# Patient Record
Sex: FEMALE | Race: WHITE | NOT HISPANIC OR LATINO
[De-identification: names, ages, dates, MRNs, and addresses within clinical notes are randomized per-mention and may not be internally consistent; named-entity substitution may affect disease eponyms.]

---

## 2017-01-29 PROBLEM — Z80.1 FAMILY HISTORY OF LUNG CANCER: Status: ACTIVE | Noted: 2017-01-29

## 2018-01-31 ENCOUNTER — APPOINTMENT (OUTPATIENT)
Dept: OBGYN | Facility: CLINIC | Age: 58
End: 2018-01-31
Payer: COMMERCIAL

## 2018-01-31 VITALS
HEIGHT: 64 IN | BODY MASS INDEX: 26.46 KG/M2 | SYSTOLIC BLOOD PRESSURE: 152 MMHG | WEIGHT: 155 LBS | DIASTOLIC BLOOD PRESSURE: 80 MMHG

## 2018-01-31 PROCEDURE — 99396 PREV VISIT EST AGE 40-64: CPT

## 2018-02-05 LAB — CYTOLOGY CVX/VAG DOC THIN PREP: NORMAL

## 2019-03-19 ENCOUNTER — APPOINTMENT (OUTPATIENT)
Dept: OBGYN | Facility: CLINIC | Age: 59
End: 2019-03-19
Payer: COMMERCIAL

## 2019-03-19 VITALS
SYSTOLIC BLOOD PRESSURE: 122 MMHG | WEIGHT: 154 LBS | DIASTOLIC BLOOD PRESSURE: 82 MMHG | BODY MASS INDEX: 26.29 KG/M2 | HEIGHT: 64 IN

## 2019-03-19 DIAGNOSIS — Z00.00 ENCOUNTER FOR GENERAL ADULT MEDICAL EXAMINATION W/OUT ABNORMAL FINDINGS: ICD-10-CM

## 2019-03-19 PROCEDURE — 99396 PREV VISIT EST AGE 40-64: CPT

## 2019-03-19 NOTE — PHYSICAL EXAM
[Acute Distress] : no acute distress [Alert] : alert [Awake] : awake [Mass] : no breast mass [Nipple Discharge] : no nipple discharge [Axillary LAD] : no axillary lymphadenopathy [Soft] : soft [Tender] : non tender [Oriented x3] : oriented to person, place, and time [Distended] : not distended [Normal] : cervix [Adnexa Absent] : absent bilaterally [Ovarian Mass (___ Cm)] : there were no adnexal masses

## 2019-03-19 NOTE — REVIEW OF SYSTEMS
[SOB on Exertion] : shortness of breath during exertion [Joint Pain] : joint pain [Nl] : Hematologic/Lymphatic

## 2019-03-25 LAB — CYTOLOGY CVX/VAG DOC THIN PREP: NORMAL

## 2019-05-31 ENCOUNTER — TRANSCRIPTION ENCOUNTER (OUTPATIENT)
Age: 59
End: 2019-05-31

## 2020-06-01 ENCOUNTER — APPOINTMENT (OUTPATIENT)
Dept: OBGYN | Facility: CLINIC | Age: 60
End: 2020-06-01
Payer: COMMERCIAL

## 2020-06-01 VITALS
WEIGHT: 164 LBS | BODY MASS INDEX: 28 KG/M2 | SYSTOLIC BLOOD PRESSURE: 152 MMHG | HEIGHT: 64 IN | DIASTOLIC BLOOD PRESSURE: 90 MMHG

## 2020-06-01 PROCEDURE — 99396 PREV VISIT EST AGE 40-64: CPT

## 2020-06-04 LAB — CYTOLOGY CVX/VAG DOC THIN PREP: ABNORMAL

## 2021-04-01 ENCOUNTER — TRANSCRIPTION ENCOUNTER (OUTPATIENT)
Age: 61
End: 2021-04-01

## 2021-04-03 ENCOUNTER — APPOINTMENT (OUTPATIENT)
Dept: DISASTER EMERGENCY | Facility: HOSPITAL | Age: 61
End: 2021-04-03

## 2021-04-03 ENCOUNTER — OUTPATIENT (OUTPATIENT)
Dept: INPATIENT UNIT | Facility: HOSPITAL | Age: 61
LOS: 1 days | End: 2021-04-03
Payer: COMMERCIAL

## 2021-04-03 VITALS
OXYGEN SATURATION: 90 % | DIASTOLIC BLOOD PRESSURE: 75 MMHG | RESPIRATION RATE: 20 BRPM | HEART RATE: 92 BPM | WEIGHT: 154.98 LBS | TEMPERATURE: 98 F | SYSTOLIC BLOOD PRESSURE: 127 MMHG | HEIGHT: 64 IN

## 2021-04-03 VITALS
HEART RATE: 74 BPM | RESPIRATION RATE: 18 BRPM | OXYGEN SATURATION: 92 % | DIASTOLIC BLOOD PRESSURE: 81 MMHG | SYSTOLIC BLOOD PRESSURE: 120 MMHG | TEMPERATURE: 98 F

## 2021-04-03 DIAGNOSIS — U07.1 COVID-19: ICD-10-CM

## 2021-04-03 PROCEDURE — M0243: CPT

## 2021-04-03 RX ORDER — SODIUM CHLORIDE 9 MG/ML
250 INJECTION INTRAMUSCULAR; INTRAVENOUS; SUBCUTANEOUS
Refills: 0 | Status: COMPLETED | OUTPATIENT
Start: 2021-04-03 | End: 2021-04-03

## 2021-04-03 RX ADMIN — SODIUM CHLORIDE 25 MILLILITER(S): 9 INJECTION INTRAMUSCULAR; INTRAVENOUS; SUBCUTANEOUS at 10:26

## 2021-04-03 NOTE — MONOCLONAL ANTIBODY INFUSION - EXAM
Physical Exam:    Appearance: NAD	  HEENT:   Normal oral mucosa  Lymphatic: No lymphadenopathy  Cardiovascular: Normal S1 S2, No JVD, No edema  Respiratory: B/L symmetrical chest rise, Lungs clear to auscultation, non-labored breathing  Gastrointestinal:  Soft, Non-tender, + BS	  Skin: warm and dry  Neurologic: Non-focal  Extremities: Normal range of motion   Physical Exam:  Appearance: NAD	  HEENT:   Normal oral mucosa  Lymphatic: No lymphadenopathy  Cardiovascular: Normal S1 S2, No JVD, No edema  Respiratory: B/L symmetrical chest rise, Lungs clear to auscultation, non-labored breathing  Gastrointestinal:  Soft, Non-tender, + BS	  Skin: warm and dry  Neurologic: Non-focal  Extremities: Normal range of motion

## 2021-04-04 ENCOUNTER — TRANSCRIPTION ENCOUNTER (OUTPATIENT)
Age: 61
End: 2021-04-04

## 2021-04-04 ENCOUNTER — INPATIENT (INPATIENT)
Facility: HOSPITAL | Age: 61
LOS: 5 days | Discharge: HOME | End: 2021-04-10
Attending: INTERNAL MEDICINE | Admitting: INTERNAL MEDICINE
Payer: COMMERCIAL

## 2021-04-04 VITALS
HEIGHT: 64 IN | HEART RATE: 112 BPM | SYSTOLIC BLOOD PRESSURE: 136 MMHG | RESPIRATION RATE: 26 BRPM | DIASTOLIC BLOOD PRESSURE: 77 MMHG | TEMPERATURE: 97 F | OXYGEN SATURATION: 95 %

## 2021-04-04 DIAGNOSIS — Z90.722 ACQUIRED ABSENCE OF OVARIES, BILATERAL: Chronic | ICD-10-CM

## 2021-04-04 DIAGNOSIS — J96.11 CHRONIC RESPIRATORY FAILURE WITH HYPOXIA: ICD-10-CM

## 2021-04-04 DIAGNOSIS — J44.9 CHRONIC OBSTRUCTIVE PULMONARY DISEASE, UNSPECIFIED: ICD-10-CM

## 2021-04-04 DIAGNOSIS — E87.6 HYPOKALEMIA: ICD-10-CM

## 2021-04-04 DIAGNOSIS — R06.02 SHORTNESS OF BREATH: ICD-10-CM

## 2021-04-04 DIAGNOSIS — Z29.9 ENCOUNTER FOR PROPHYLACTIC MEASURES, UNSPECIFIED: ICD-10-CM

## 2021-04-04 DIAGNOSIS — U07.1 COVID-19: ICD-10-CM

## 2021-04-04 LAB
ALBUMIN SERPL ELPH-MCNC: 3.3 G/DL — SIGNIFICANT CHANGE UP (ref 3.3–5)
ALP SERPL-CCNC: 62 U/L — SIGNIFICANT CHANGE UP (ref 40–120)
ALT FLD-CCNC: 21 U/L — SIGNIFICANT CHANGE UP (ref 4–33)
ANION GAP SERPL CALC-SCNC: 13 MMOL/L — SIGNIFICANT CHANGE UP (ref 7–14)
APTT BLD: 28.5 SEC — SIGNIFICANT CHANGE UP (ref 27–36.3)
AST SERPL-CCNC: 35 U/L — HIGH (ref 4–32)
BASOPHILS # BLD AUTO: 0 K/UL — SIGNIFICANT CHANGE UP (ref 0–0.2)
BASOPHILS NFR BLD AUTO: 0 % — SIGNIFICANT CHANGE UP (ref 0–2)
BILIRUB SERPL-MCNC: 0.8 MG/DL — SIGNIFICANT CHANGE UP (ref 0.2–1.2)
BLOOD GAS VENOUS COMPREHENSIVE RESULT: SIGNIFICANT CHANGE UP
BUN SERPL-MCNC: 9 MG/DL — SIGNIFICANT CHANGE UP (ref 7–23)
CALCIUM SERPL-MCNC: 8.5 MG/DL — SIGNIFICANT CHANGE UP (ref 8.4–10.5)
CHLORIDE SERPL-SCNC: 96 MMOL/L — LOW (ref 98–107)
CO2 SERPL-SCNC: 23 MMOL/L — SIGNIFICANT CHANGE UP (ref 22–31)
CREAT SERPL-MCNC: 0.48 MG/DL — LOW (ref 0.5–1.3)
EOSINOPHIL # BLD AUTO: 0.24 K/UL — SIGNIFICANT CHANGE UP (ref 0–0.5)
EOSINOPHIL NFR BLD AUTO: 3.5 % — SIGNIFICANT CHANGE UP (ref 0–6)
GLUCOSE BLDC GLUCOMTR-MCNC: 144 MG/DL — HIGH (ref 70–99)
GLUCOSE SERPL-MCNC: 124 MG/DL — HIGH (ref 70–99)
HCT VFR BLD CALC: 44.1 % — SIGNIFICANT CHANGE UP (ref 34.5–45)
HGB BLD-MCNC: 13.6 G/DL — SIGNIFICANT CHANGE UP (ref 11.5–15.5)
IANC: 5.2 K/UL — SIGNIFICANT CHANGE UP (ref 1.5–8.5)
INR BLD: 1.19 RATIO — HIGH (ref 0.88–1.16)
LYMPHOCYTES # BLD AUTO: 0.42 K/UL — LOW (ref 1–3.3)
LYMPHOCYTES # BLD AUTO: 6.1 % — LOW (ref 13–44)
MAGNESIUM SERPL-MCNC: 1.8 MG/DL — SIGNIFICANT CHANGE UP (ref 1.6–2.6)
MCHC RBC-ENTMCNC: 24.6 PG — LOW (ref 27–34)
MCHC RBC-ENTMCNC: 30.8 GM/DL — LOW (ref 32–36)
MCV RBC AUTO: 79.7 FL — LOW (ref 80–100)
MONOCYTES # BLD AUTO: 0.3 K/UL — SIGNIFICANT CHANGE UP (ref 0–0.9)
MONOCYTES NFR BLD AUTO: 4.4 % — SIGNIFICANT CHANGE UP (ref 2–14)
NEUTROPHILS # BLD AUTO: 5.65 K/UL — SIGNIFICANT CHANGE UP (ref 1.8–7.4)
NEUTROPHILS NFR BLD AUTO: 81.6 % — HIGH (ref 43–77)
PLATELET # BLD AUTO: 189 K/UL — SIGNIFICANT CHANGE UP (ref 150–400)
POTASSIUM SERPL-MCNC: 3 MMOL/L — LOW (ref 3.5–5.3)
POTASSIUM SERPL-SCNC: 3 MMOL/L — LOW (ref 3.5–5.3)
PROT SERPL-MCNC: 7.2 G/DL — SIGNIFICANT CHANGE UP (ref 6–8.3)
PROTHROM AB SERPL-ACNC: 13.6 SEC — SIGNIFICANT CHANGE UP (ref 10.6–13.6)
RBC # BLD: 5.53 M/UL — HIGH (ref 3.8–5.2)
RBC # FLD: 13.8 % — SIGNIFICANT CHANGE UP (ref 10.3–14.5)
SARS-COV-2 RNA SPEC QL NAA+PROBE: DETECTED
SODIUM SERPL-SCNC: 132 MMOL/L — LOW (ref 135–145)
TROPONIN T, HIGH SENSITIVITY RESULT: <6 NG/L — SIGNIFICANT CHANGE UP
WBC # BLD: 6.93 K/UL — SIGNIFICANT CHANGE UP (ref 3.8–10.5)
WBC # FLD AUTO: 6.93 K/UL — SIGNIFICANT CHANGE UP (ref 3.8–10.5)

## 2021-04-04 PROCEDURE — 99223 1ST HOSP IP/OBS HIGH 75: CPT

## 2021-04-04 PROCEDURE — 93010 ELECTROCARDIOGRAM REPORT: CPT

## 2021-04-04 PROCEDURE — 99285 EMERGENCY DEPT VISIT HI MDM: CPT | Mod: 25

## 2021-04-04 PROCEDURE — 71045 X-RAY EXAM CHEST 1 VIEW: CPT | Mod: 26

## 2021-04-04 RX ORDER — SODIUM CHLORIDE 9 MG/ML
500 INJECTION INTRAMUSCULAR; INTRAVENOUS; SUBCUTANEOUS ONCE
Refills: 0 | Status: COMPLETED | OUTPATIENT
Start: 2021-04-04 | End: 2021-04-04

## 2021-04-04 RX ORDER — TIOTROPIUM BROMIDE 18 UG/1
1 CAPSULE ORAL; RESPIRATORY (INHALATION) EVERY 24 HOURS
Refills: 0 | Status: DISCONTINUED | OUTPATIENT
Start: 2021-04-04 | End: 2021-04-10

## 2021-04-04 RX ORDER — SODIUM CHLORIDE 9 MG/ML
1000 INJECTION INTRAMUSCULAR; INTRAVENOUS; SUBCUTANEOUS ONCE
Refills: 0 | Status: COMPLETED | OUTPATIENT
Start: 2021-04-04 | End: 2021-04-04

## 2021-04-04 RX ORDER — REMDESIVIR 5 MG/ML
INJECTION INTRAVENOUS
Refills: 0 | Status: COMPLETED | OUTPATIENT
Start: 2021-04-04 | End: 2021-04-08

## 2021-04-04 RX ORDER — DEXAMETHASONE 0.5 MG/5ML
6 ELIXIR ORAL ONCE
Refills: 0 | Status: COMPLETED | OUTPATIENT
Start: 2021-04-04 | End: 2021-04-04

## 2021-04-04 RX ORDER — POTASSIUM CHLORIDE 20 MEQ
10 PACKET (EA) ORAL
Refills: 0 | Status: DISCONTINUED | OUTPATIENT
Start: 2021-04-04 | End: 2021-04-10

## 2021-04-04 RX ORDER — ACETAMINOPHEN 500 MG
650 TABLET ORAL EVERY 6 HOURS
Refills: 0 | Status: DISCONTINUED | OUTPATIENT
Start: 2021-04-04 | End: 2021-04-10

## 2021-04-04 RX ORDER — DEXAMETHASONE 0.5 MG/5ML
6 ELIXIR ORAL DAILY
Refills: 0 | Status: DISCONTINUED | OUTPATIENT
Start: 2021-04-05 | End: 2021-04-10

## 2021-04-04 RX ORDER — ENOXAPARIN SODIUM 100 MG/ML
40 INJECTION SUBCUTANEOUS DAILY
Refills: 0 | Status: DISCONTINUED | OUTPATIENT
Start: 2021-04-04 | End: 2021-04-10

## 2021-04-04 RX ORDER — FLUTICASONE PROPIONATE AND SALMETEROL 50; 250 UG/1; UG/1
1 POWDER ORAL; RESPIRATORY (INHALATION)
Qty: 0 | Refills: 0 | DISCHARGE

## 2021-04-04 RX ORDER — BUDESONIDE AND FORMOTEROL FUMARATE DIHYDRATE 160; 4.5 UG/1; UG/1
2 AEROSOL RESPIRATORY (INHALATION)
Refills: 0 | Status: DISCONTINUED | OUTPATIENT
Start: 2021-04-04 | End: 2021-04-10

## 2021-04-04 RX ORDER — CEFTRIAXONE 500 MG/1
1000 INJECTION, POWDER, FOR SOLUTION INTRAMUSCULAR; INTRAVENOUS ONCE
Refills: 0 | Status: DISCONTINUED | OUTPATIENT
Start: 2021-04-04 | End: 2021-04-04

## 2021-04-04 RX ORDER — REMDESIVIR 5 MG/ML
200 INJECTION INTRAVENOUS EVERY 24 HOURS
Refills: 0 | Status: COMPLETED | OUTPATIENT
Start: 2021-04-04 | End: 2021-04-04

## 2021-04-04 RX ORDER — REMDESIVIR 5 MG/ML
100 INJECTION INTRAVENOUS EVERY 24 HOURS
Refills: 0 | Status: COMPLETED | OUTPATIENT
Start: 2021-04-05 | End: 2021-04-08

## 2021-04-04 RX ORDER — POTASSIUM CHLORIDE 20 MEQ
20 PACKET (EA) ORAL EVERY 4 HOURS
Refills: 0 | Status: COMPLETED | OUTPATIENT
Start: 2021-04-04 | End: 2021-04-04

## 2021-04-04 RX ADMIN — BUDESONIDE AND FORMOTEROL FUMARATE DIHYDRATE 2 PUFF(S): 160; 4.5 AEROSOL RESPIRATORY (INHALATION) at 22:30

## 2021-04-04 RX ADMIN — SODIUM CHLORIDE 500 MILLILITER(S): 9 INJECTION INTRAMUSCULAR; INTRAVENOUS; SUBCUTANEOUS at 10:07

## 2021-04-04 RX ADMIN — Medication 6 MILLIGRAM(S): at 09:22

## 2021-04-04 RX ADMIN — Medication 20 MILLIEQUIVALENT(S): at 13:54

## 2021-04-04 RX ADMIN — Medication 100 MILLIEQUIVALENT(S): at 10:49

## 2021-04-04 RX ADMIN — Medication 20 MILLIEQUIVALENT(S): at 19:26

## 2021-04-04 RX ADMIN — REMDESIVIR 200 MILLIGRAM(S): 5 INJECTION INTRAVENOUS at 22:30

## 2021-04-04 RX ADMIN — Medication 100 MILLIEQUIVALENT(S): at 13:18

## 2021-04-04 RX ADMIN — SODIUM CHLORIDE 1000 MILLILITER(S): 9 INJECTION INTRAMUSCULAR; INTRAVENOUS; SUBCUTANEOUS at 13:18

## 2021-04-04 NOTE — ED ADULT NURSE NOTE - CHIEF COMPLAINT QUOTE
states" I am having weakness and SOB and I am COVID positive since 1 week. h/o COPD, current smoker baseline O2 at 94. patient room air sat 89% went up to 95% with 6L O2. Berny Choudhary (son in law) #[phone]

## 2021-04-04 NOTE — H&P ADULT - NSHPLABSRESULTS_GEN_ALL_CORE
ProCalcitonin: 0.14  Ferritin: 1365  CRP: 119  Trop<6  EKG: NSR @ 97bpm, Low Voltage, QTC 424ms  CXR: Left lower peripheral lung hazy opacity consistent with COVID.  O2 Sat: 96% on 3L NC.                        13.6   6.93  )-----------( 189      ( 04 Apr 2021 09:15 )             44.1     04-04    132<L>  |  96<L>  |  9   ----------------------------<  124<H>  3.0<L>   |  23  |  0.48<L>    Ca    8.5      04 Apr 2021 09:15  Mg     1.8     04-04    TPro  7.2  /  Alb  3.3  /  TBili  0.8  /  DBili  x   /  AST  35<H>  /  ALT  21  /  AlkPhos  62  04-04        PT/INR - ( 04 Apr 2021 09:15 )   PT: 13.6 sec;   INR: 1.19 ratio         PTT - ( 04 Apr 2021 09:15 )  PTT:28.5 sec  LIVER FUNCTIONS - ( 04 Apr 2021 09:15 )  Alb: 3.3 g/dL / Pro: 7.2 g/dL / ALK PHOS: 62 U/L / ALT: 21 U/L / AST: 35 U/L / GGT: x

## 2021-04-04 NOTE — ED PROVIDER NOTE - NS ED ROS FT
Constitutional: (-) fever  Head: Normal cephalic, Atraumatic  Eyes/ENT: (-) vision changes  Cardiovascular: (-) chest pain, (-) wheezing  Respiratory: (-) cough, (+) shortness of breath  Gastrointestinal: (-) vomiting, (-) diarrhea, (-) abdominal pain  : (-) dysuria   Musculoskeletal: (-) back pain  Integumentary: (-) rash, (-) edema  Neurological: (-)loc  Allergic/Immunologic: (-) pruritus

## 2021-04-04 NOTE — H&P ADULT - PROBLEM SELECTOR PLAN 1
Continuous Pulse Oxymetry  -Incentive Spirometry  -continue Supplemental Oxygen  -monitor serum Inflammatory markers: CRP, procalcitonin and Ferritin.  -Started IV Dexamethasone 6mg Q24h and Started IV Remdesevir   -monitor LFT and Renal function  -supportive care

## 2021-04-04 NOTE — ED PROVIDER NOTE - OBJECTIVE STATEMENT
60 Y/O F w/ PMH of COPD on Advair presents to ER w/ shortness of breath. Diagnosed w/ Covid 10 days ago. Admits to weakness and shortness of breath that started yesterday. Not on home oxygen. States she feels symptoms at rest and with ambulating. No recent travel, sister/ have Covid. Decreased appetite but tolerating PO oral intake. Denies fever, nausea/vomiting/dizziness, headache, chest pain, palpitations, syncope, abdominal pain.

## 2021-04-04 NOTE — H&P ADULT - NSHPSOCIALHISTORY_GEN_ALL_CORE
Pt , lives with spouse. Denies smoking, drinking or drugs. Former smoker 1ppd x40 yrs, quit 5 yrs ago. Ambulates freely w/o DME.

## 2021-04-04 NOTE — H&P ADULT - ATTENDING COMMENTS
60 yo F with h/o COPD presenting with shortness of breath. Admitted for acute hypoxic respiratory failure secondary to COVID-19. Will continue O2 via NC- continuous pulse ox monitoring. Starting decadron and Remdesivir.

## 2021-04-04 NOTE — H&P ADULT - PROBLEM SELECTOR PLAN 2
Continuous SpO2 monitoring  -continue Advair  -added Spiriva daily Likely in a setting of diarrhea from COVID infection  -supplementing with IV KCl  and PO KDur.  -monitor electrolytes daily  -serial EKGs

## 2021-04-04 NOTE — ED PROVIDER NOTE - ATTENDING CONTRIBUTION TO CARE
Attending MD Jane.  Agree with above.  Pt is a 62 yo female with pmhx of COPD, on Advair, doesn’t have home O2 or rescue inhalers.  Pt dxed with COVID 10 days ago began feeling SOB yesterday with SOB with exertion, fatigue.  Pt sating 90% ORA at rest and desats to 83% with attempt to walk 3 steps.  Pt tachypneic to 30’s.  Pt tachycardic to 140’s.  Pt with rales to bilateral lower lobes reported by PA.  This was not appreciated on my evaluation and pt had slightly reduced breath sounds but no rales/ronchi.  No known hx of heart failure.  Pt has no LE edema.  Pt endorses diarrhea earlier in course but this has resolved.  Pt received monoclonal Ab's as an outpt yesterday.  Has not been vaccinated. Attending MD Jane.  Agree with above.  Pt is a 60 yo female with pmhx of COPD, on Advair, doesn’t have home O2 or rescue inhalers.  Pt dxed with COVID 10 days ago began feeling SOB yesterday with SOB with exertion, fatigue.  Pt sating 90% ORA at rest and desats to 83% with attempt to walk 3 steps.  Pt tachypneic to 30’s.  Pt tachycardic to 140’s.  Pt with rales to bilateral lower lobes reported by PA.  This was not appreciated on my evaluation and pt had slightly reduced breath sounds but no rales/ronchi.  No known hx of heart failure.  Pt has no LE edema.  Pt endorses diarrhea earlier in course but this has resolved.  Pt received monoclonal Ab's as an outpt yesterday.  Has not been vaccinated.  Pt hypoxic with any movement.  Pt hemodynamically stable for admission to medicine. Attending MD Jane.  Agree with above.  Pt is a 60 yo female with pmhx of COPD, on Advair, doesn’t have home O2 or rescue inhalers.  Pt dxed with COVID 10 days ago began feeling SOB yesterday with SOB with exertion, fatigue.  Pt sating 90% ORA at rest and desats to 83% with attempt to walk 3 steps.  Pt tachypneic to 30’s.  Pt tachycardic to 140’s.  Pt with rales to bilateral lower lobes reported by PA.  This was not appreciated on my evaluation and pt had slightly reduced breath sounds but no rales/ronchi.  No known hx of heart failure.  Pt has no LE edema.  Pt endorses diarrhea earlier in course but this has resolved.  Pt received monoclonal Ab's as an outpt yesterday.  Has not been vaccinated.  Pt hypoxic with any movement.  Pt BP stable for admission to medicine though she remains tachycardic.

## 2021-04-04 NOTE — H&P ADULT - HISTORY OF PRESENT ILLNESS
62 Y/O F w/ PMH of COPD on Advair presents to ER p/w dyspnea x 7 days, worse last night. Pt initially developed productive cough with clear phlegm and diarrhea, about 3 episodes/day. Symptoms then accompanied with CASTRO with ADLs.  Pt diagnosed w/ Covid 10 days ago, reports her sister is sick with COVID. Yesterday, pt went to [facility] and received IV Monoclonal Antibody therapy. However, last night dyspnea worsened, now dyspnea with minimal exertion. Pt checked her O2 sat at home found to be 89% on room air. She also admits to weakness, poor appetite, chills and dizziness upon ambulation. She denies fever, chest pain, palpitations, vomiting or abdominal pain. Pt asked her son in law to bring to [facility] ED.     60 Y/O F w/ PMH of COPD on Advair presents to ER p/w dyspnea x 7 days, worse last night. Pt initially developed productive cough with clear phlegm and diarrhea, about 3 episodes/day. Symptoms then accompanied with CASTRO with ADLs.  Pt diagnosed w/ Covid 10 days ago, reports her sister is sick with COVID. Yesterday, pt went to [facility] and received IV Monoclonal Antibody therapy. However, last night dyspnea worsened, now dyspnea with minimal exertion. Pt checked her O2 sat at home found to be 89% on room air. She also admits to weakness, poor appetite, chills and dizziness upon ambulation. She denies fever, chest pain, palpitations, vomiting or abdominal pain. Pt asked her son in law to bring to [facility] ED.    In ED pt was hypoxic to 83%  on ambulation, was given IV Decadron with some relief.   60 Y/O F w/ PMH of COPD on Advair (not o2 dependent) presents to ER p/w dyspnea x 7 days, worse last night. Pt initially developed productive cough with clear phlegm and diarrhea, about 3 episodes/day. Symptoms then accompanied with CASTRO with ADLs.  Pt diagnosed w/ Covid 10 days ago, reports her sister is sick with COVID. Yesterday, pt went to [facility] and received IV Monoclonal Antibody therapy. However, last night dyspnea worsened, now dyspnea with minimal exertion. Pt checked her O2 sat at home found to be 89% on room air. She also admits to weakness, poor appetite, chills and dizziness upon ambulation. She denies fever, chest pain, palpitations, vomiting or abdominal pain. Pt asked her son in law to bring to [facility] ED.    In ED pt was hypoxic to 83%  on ambulation, was given IV Decadron with some relief.

## 2021-04-04 NOTE — ED PROVIDER NOTE - CLINICAL SUMMARY MEDICAL DECISION MAKING FREE TEXT BOX
62 Y/O F w/ PMH of COPD on Advair presents to ER w/ shortness of breath.  Resting on RA 90%. Ambulatory 83%  Tachypneic to high twenties, Tachycardic  Covid order set/CXR pending  Decadron/IVF  Likely admission 60 Y/O F w/ PMH of COPD on Advair presents to ER w/ shortness of breath.  Resting on RA 90%. Ambulatory 83%  Tachypneic to high twenties, Tachycardic  Covid order set/CXR pending  Decadron/IVF  Likely admission    Will admit to medicine for hypoxia 2/2 Covid. Hypokalemic. Pending K run and IVF

## 2021-04-04 NOTE — H&P ADULT - RS GEN PE MLT RESP DETAILS PC
no chest wall tenderness/no wheezes/diminished breath sounds, L/diminished breath sounds, R/respirations labored/rales

## 2021-04-04 NOTE — ED ADULT NURSE NOTE - OBJECTIVE STATEMENT
p/t is a 61y old female with hx COPD,  received awake and responsive, c/o of increased SOB for 2 days, p/t tested Covid positive 10 days ago, labs drawn and sent as per MD order

## 2021-04-04 NOTE — ED ADULT TRIAGE NOTE - CHIEF COMPLAINT QUOTE
states" I am having weakness and SOB and I am COVID positive since 1 week. h/o COPD, current smoker baseline O2 at 94. patient room air sat 89% went up to 95% with 6L O2. Berny Choudhary (son in law) #[phone]

## 2021-04-04 NOTE — ED PROVIDER NOTE - PHYSICAL EXAMINATION
Vital signs reviewed.   CONSTITUTIONAL: Well-appearing; well-nourished; in no apparent distress. Non-toxic appearing.   HEAD: Normocephalic, atraumatic.  EYES: PERRL, EOM intact, conjunctiva and no sclera injection noted  ENT: normal nose; no rhinorrhea  CARD: Normal S1, S2  RESP: Normal chest excursion with respiration; Diminished breath sounds B/L posterior lower lung fields  ABD/GI: soft, non-distended; non-tender  EXT/MS: moves all extremities; distal pulses are normal, no pedal edema.  SKIN: Normal for age and race; warm; dry; good turgor; no apparent lesions or exudate noted.  NEURO: Awake, alert, oriented x 3, no gross deficits, CN II-XII grossly intact, no motor or sensory deficit noted.  PSYCH: Normal mood; appropriate affect.

## 2021-04-05 ENCOUNTER — TRANSCRIPTION ENCOUNTER (OUTPATIENT)
Age: 61
End: 2021-04-05

## 2021-04-05 LAB
A1C WITH ESTIMATED AVERAGE GLUCOSE RESULT: 5.9 % — HIGH (ref 4–5.6)
ALBUMIN SERPL ELPH-MCNC: 3.1 G/DL — LOW (ref 3.3–5)
ALP SERPL-CCNC: 56 U/L — SIGNIFICANT CHANGE UP (ref 40–120)
ALT FLD-CCNC: 20 U/L — SIGNIFICANT CHANGE UP (ref 4–33)
ANION GAP SERPL CALC-SCNC: 13 MMOL/L — SIGNIFICANT CHANGE UP (ref 7–14)
AST SERPL-CCNC: 27 U/L — SIGNIFICANT CHANGE UP (ref 4–32)
BILIRUB SERPL-MCNC: 0.5 MG/DL — SIGNIFICANT CHANGE UP (ref 0.2–1.2)
BUN SERPL-MCNC: 11 MG/DL — SIGNIFICANT CHANGE UP (ref 7–23)
CALCIUM SERPL-MCNC: 8.6 MG/DL — SIGNIFICANT CHANGE UP (ref 8.4–10.5)
CHLORIDE SERPL-SCNC: 105 MMOL/L — SIGNIFICANT CHANGE UP (ref 98–107)
CO2 SERPL-SCNC: 22 MMOL/L — SIGNIFICANT CHANGE UP (ref 22–31)
COVID-19 SPIKE DOMAIN AB INTERP: POSITIVE
COVID-19 SPIKE DOMAIN ANTIBODY RESULT: 214 U/ML — HIGH
CREAT SERPL-MCNC: 0.41 MG/DL — LOW (ref 0.5–1.3)
ESTIMATED AVERAGE GLUCOSE: 123 MG/DL — HIGH (ref 68–114)
GLUCOSE SERPL-MCNC: 124 MG/DL — HIGH (ref 70–99)
HCV AB S/CO SERPL IA: 0.09 S/CO — SIGNIFICANT CHANGE UP (ref 0–0.99)
HCV AB SERPL-IMP: SIGNIFICANT CHANGE UP
POTASSIUM SERPL-MCNC: 4.2 MMOL/L — SIGNIFICANT CHANGE UP (ref 3.5–5.3)
POTASSIUM SERPL-SCNC: 4.2 MMOL/L — SIGNIFICANT CHANGE UP (ref 3.5–5.3)
PROT SERPL-MCNC: 6.6 G/DL — SIGNIFICANT CHANGE UP (ref 6–8.3)
SARS-COV-2 IGG+IGM SERPL QL IA: 214 U/ML — HIGH
SARS-COV-2 IGG+IGM SERPL QL IA: POSITIVE
SODIUM SERPL-SCNC: 140 MMOL/L — SIGNIFICANT CHANGE UP (ref 135–145)

## 2021-04-05 RX ADMIN — TIOTROPIUM BROMIDE 1 CAPSULE(S): 18 CAPSULE ORAL; RESPIRATORY (INHALATION) at 11:11

## 2021-04-05 RX ADMIN — BUDESONIDE AND FORMOTEROL FUMARATE DIHYDRATE 2 PUFF(S): 160; 4.5 AEROSOL RESPIRATORY (INHALATION) at 21:37

## 2021-04-05 RX ADMIN — BUDESONIDE AND FORMOTEROL FUMARATE DIHYDRATE 2 PUFF(S): 160; 4.5 AEROSOL RESPIRATORY (INHALATION) at 09:31

## 2021-04-05 RX ADMIN — REMDESIVIR 500 MILLIGRAM(S): 5 INJECTION INTRAVENOUS at 13:29

## 2021-04-05 RX ADMIN — Medication 0.25 MILLIGRAM(S): at 18:15

## 2021-04-05 RX ADMIN — Medication 6 MILLIGRAM(S): at 06:05

## 2021-04-05 RX ADMIN — ENOXAPARIN SODIUM 40 MILLIGRAM(S): 100 INJECTION SUBCUTANEOUS at 13:28

## 2021-04-05 NOTE — PROVIDER CONTACT NOTE (OTHER) - ASSESSMENT
Pt A&Ox4, anxious, tremor noted to hands, Pt tachypneic, oxygen saturation sustaining 88-89% on 4L via nasal canula

## 2021-04-05 NOTE — PHYSICAL THERAPY INITIAL EVALUATION ADULT - LEVEL OF INDEPENDENCE: GAIT, REHAB EVAL
To be assessed. Deferred at this time. Noted pt. SpO2 to be 86% on O2 in standing, pt. reported SOB. Symptoms improved when pt. returned to bed, SpO2 91%.

## 2021-04-05 NOTE — PHYSICAL THERAPY INITIAL EVALUATION ADULT - LIVES WITH, PROFILE
Lives in a house with . Has a few steps to enter, 1 flight of stairs inside to get to bedroom on 2nd floor.

## 2021-04-05 NOTE — PROVIDER CONTACT NOTE (OTHER) - RECOMMENDATIONS
small dose of antianxiety medications maintain oxygen at 4L via nasal canula unless oxygen does not improve with antianxiety medication

## 2021-04-05 NOTE — PHYSICAL THERAPY INITIAL EVALUATION ADULT - ADDITIONAL COMMENTS
Pt. was left in bed post PT Evaluation, HOB 43 degrees, no apparent distress, all lines intact, SpO2 91% on O2.

## 2021-04-06 LAB
ALBUMIN SERPL ELPH-MCNC: 3.1 G/DL — LOW (ref 3.3–5)
ALP SERPL-CCNC: 58 U/L — SIGNIFICANT CHANGE UP (ref 40–120)
ALT FLD-CCNC: 21 U/L — SIGNIFICANT CHANGE UP (ref 4–33)
ANION GAP SERPL CALC-SCNC: 12 MMOL/L — SIGNIFICANT CHANGE UP (ref 7–14)
AST SERPL-CCNC: 24 U/L — SIGNIFICANT CHANGE UP (ref 4–32)
BILIRUB SERPL-MCNC: 0.4 MG/DL — SIGNIFICANT CHANGE UP (ref 0.2–1.2)
BUN SERPL-MCNC: 21 MG/DL — SIGNIFICANT CHANGE UP (ref 7–23)
CALCIUM SERPL-MCNC: 8.8 MG/DL — SIGNIFICANT CHANGE UP (ref 8.4–10.5)
CHLORIDE SERPL-SCNC: 104 MMOL/L — SIGNIFICANT CHANGE UP (ref 98–107)
CO2 SERPL-SCNC: 23 MMOL/L — SIGNIFICANT CHANGE UP (ref 22–31)
CREAT SERPL-MCNC: 0.44 MG/DL — LOW (ref 0.5–1.3)
GLUCOSE SERPL-MCNC: 111 MG/DL — HIGH (ref 70–99)
HCT VFR BLD CALC: 37.8 % — SIGNIFICANT CHANGE UP (ref 34.5–45)
HGB BLD-MCNC: 12.1 G/DL — SIGNIFICANT CHANGE UP (ref 11.5–15.5)
MAGNESIUM SERPL-MCNC: 2.2 MG/DL — SIGNIFICANT CHANGE UP (ref 1.6–2.6)
MCHC RBC-ENTMCNC: 26.1 PG — LOW (ref 27–34)
MCHC RBC-ENTMCNC: 32 GM/DL — SIGNIFICANT CHANGE UP (ref 32–36)
MCV RBC AUTO: 81.5 FL — SIGNIFICANT CHANGE UP (ref 80–100)
NRBC # BLD: 0 /100 WBCS — SIGNIFICANT CHANGE UP
NRBC # FLD: 0 K/UL — SIGNIFICANT CHANGE UP
PHOSPHATE SERPL-MCNC: 3.3 MG/DL — SIGNIFICANT CHANGE UP (ref 2.5–4.5)
PLATELET # BLD AUTO: 337 K/UL — SIGNIFICANT CHANGE UP (ref 150–400)
POTASSIUM SERPL-MCNC: 3.8 MMOL/L — SIGNIFICANT CHANGE UP (ref 3.5–5.3)
POTASSIUM SERPL-SCNC: 3.8 MMOL/L — SIGNIFICANT CHANGE UP (ref 3.5–5.3)
PROT SERPL-MCNC: 6.4 G/DL — SIGNIFICANT CHANGE UP (ref 6–8.3)
RBC # BLD: 4.64 M/UL — SIGNIFICANT CHANGE UP (ref 3.8–5.2)
RBC # FLD: 14.5 % — SIGNIFICANT CHANGE UP (ref 10.3–14.5)
SODIUM SERPL-SCNC: 139 MMOL/L — SIGNIFICANT CHANGE UP (ref 135–145)
WBC # BLD: 12.12 K/UL — HIGH (ref 3.8–10.5)
WBC # FLD AUTO: 12.12 K/UL — HIGH (ref 3.8–10.5)

## 2021-04-06 RX ORDER — ALPRAZOLAM 0.25 MG
0.5 TABLET ORAL
Refills: 0 | Status: DISCONTINUED | OUTPATIENT
Start: 2021-04-06 | End: 2021-04-08

## 2021-04-06 RX ADMIN — REMDESIVIR 500 MILLIGRAM(S): 5 INJECTION INTRAVENOUS at 15:21

## 2021-04-06 RX ADMIN — Medication 6 MILLIGRAM(S): at 06:06

## 2021-04-06 RX ADMIN — BUDESONIDE AND FORMOTEROL FUMARATE DIHYDRATE 2 PUFF(S): 160; 4.5 AEROSOL RESPIRATORY (INHALATION) at 10:04

## 2021-04-06 RX ADMIN — TIOTROPIUM BROMIDE 1 CAPSULE(S): 18 CAPSULE ORAL; RESPIRATORY (INHALATION) at 10:42

## 2021-04-06 RX ADMIN — BUDESONIDE AND FORMOTEROL FUMARATE DIHYDRATE 2 PUFF(S): 160; 4.5 AEROSOL RESPIRATORY (INHALATION) at 21:03

## 2021-04-06 RX ADMIN — ENOXAPARIN SODIUM 40 MILLIGRAM(S): 100 INJECTION SUBCUTANEOUS at 11:47

## 2021-04-07 LAB
ALBUMIN SERPL ELPH-MCNC: 3 G/DL — LOW (ref 3.3–5)
ALP SERPL-CCNC: 65 U/L — SIGNIFICANT CHANGE UP (ref 40–120)
ALT FLD-CCNC: 22 U/L — SIGNIFICANT CHANGE UP (ref 4–33)
ANION GAP SERPL CALC-SCNC: 12 MMOL/L — SIGNIFICANT CHANGE UP (ref 7–14)
AST SERPL-CCNC: 23 U/L — SIGNIFICANT CHANGE UP (ref 4–32)
BILIRUB SERPL-MCNC: 0.4 MG/DL — SIGNIFICANT CHANGE UP (ref 0.2–1.2)
BUN SERPL-MCNC: 19 MG/DL — SIGNIFICANT CHANGE UP (ref 7–23)
CALCIUM SERPL-MCNC: 8.7 MG/DL — SIGNIFICANT CHANGE UP (ref 8.4–10.5)
CHLORIDE SERPL-SCNC: 106 MMOL/L — SIGNIFICANT CHANGE UP (ref 98–107)
CO2 SERPL-SCNC: 22 MMOL/L — SIGNIFICANT CHANGE UP (ref 22–31)
CREAT SERPL-MCNC: 0.49 MG/DL — LOW (ref 0.5–1.3)
CRP SERPL-MCNC: 24.7 MG/L — HIGH
D DIMER BLD IA.RAPID-MCNC: 307 NG/ML DDU — HIGH
FERRITIN SERPL-MCNC: 757 NG/ML — HIGH (ref 15–150)
GLUCOSE SERPL-MCNC: 93 MG/DL — SIGNIFICANT CHANGE UP (ref 70–99)
HCT VFR BLD CALC: 39.3 % — SIGNIFICANT CHANGE UP (ref 34.5–45)
HGB BLD-MCNC: 12.4 G/DL — SIGNIFICANT CHANGE UP (ref 11.5–15.5)
LDH SERPL L TO P-CCNC: 395 U/L — HIGH (ref 135–225)
MCHC RBC-ENTMCNC: 25.6 PG — LOW (ref 27–34)
MCHC RBC-ENTMCNC: 31.6 GM/DL — LOW (ref 32–36)
MCV RBC AUTO: 81.2 FL — SIGNIFICANT CHANGE UP (ref 80–100)
NRBC # BLD: 0 /100 WBCS — SIGNIFICANT CHANGE UP
NRBC # FLD: 0 K/UL — SIGNIFICANT CHANGE UP
PLATELET # BLD AUTO: 428 K/UL — HIGH (ref 150–400)
POTASSIUM SERPL-MCNC: 4 MMOL/L — SIGNIFICANT CHANGE UP (ref 3.5–5.3)
POTASSIUM SERPL-SCNC: 4 MMOL/L — SIGNIFICANT CHANGE UP (ref 3.5–5.3)
PROT SERPL-MCNC: 6.6 G/DL — SIGNIFICANT CHANGE UP (ref 6–8.3)
RBC # BLD: 4.84 M/UL — SIGNIFICANT CHANGE UP (ref 3.8–5.2)
RBC # FLD: 14.3 % — SIGNIFICANT CHANGE UP (ref 10.3–14.5)
SODIUM SERPL-SCNC: 140 MMOL/L — SIGNIFICANT CHANGE UP (ref 135–145)
WBC # BLD: 11.83 K/UL — HIGH (ref 3.8–10.5)
WBC # FLD AUTO: 11.83 K/UL — HIGH (ref 3.8–10.5)

## 2021-04-07 RX ADMIN — BUDESONIDE AND FORMOTEROL FUMARATE DIHYDRATE 2 PUFF(S): 160; 4.5 AEROSOL RESPIRATORY (INHALATION) at 08:57

## 2021-04-07 RX ADMIN — Medication 6 MILLIGRAM(S): at 06:13

## 2021-04-07 RX ADMIN — ENOXAPARIN SODIUM 40 MILLIGRAM(S): 100 INJECTION SUBCUTANEOUS at 12:00

## 2021-04-07 RX ADMIN — TIOTROPIUM BROMIDE 1 CAPSULE(S): 18 CAPSULE ORAL; RESPIRATORY (INHALATION) at 08:58

## 2021-04-07 RX ADMIN — BUDESONIDE AND FORMOTEROL FUMARATE DIHYDRATE 2 PUFF(S): 160; 4.5 AEROSOL RESPIRATORY (INHALATION) at 21:12

## 2021-04-07 RX ADMIN — REMDESIVIR 500 MILLIGRAM(S): 5 INJECTION INTRAVENOUS at 12:00

## 2021-04-08 ENCOUNTER — TRANSCRIPTION ENCOUNTER (OUTPATIENT)
Age: 61
End: 2021-04-08

## 2021-04-08 LAB
ALBUMIN SERPL ELPH-MCNC: 2.9 G/DL — LOW (ref 3.3–5)
ALP SERPL-CCNC: 62 U/L — SIGNIFICANT CHANGE UP (ref 40–120)
ALT FLD-CCNC: 22 U/L — SIGNIFICANT CHANGE UP (ref 4–33)
ANION GAP SERPL CALC-SCNC: 11 MMOL/L — SIGNIFICANT CHANGE UP (ref 7–14)
AST SERPL-CCNC: 19 U/L — SIGNIFICANT CHANGE UP (ref 4–32)
BILIRUB SERPL-MCNC: 0.4 MG/DL — SIGNIFICANT CHANGE UP (ref 0.2–1.2)
BUN SERPL-MCNC: 15 MG/DL — SIGNIFICANT CHANGE UP (ref 7–23)
CALCIUM SERPL-MCNC: 8.3 MG/DL — LOW (ref 8.4–10.5)
CHLORIDE SERPL-SCNC: 103 MMOL/L — SIGNIFICANT CHANGE UP (ref 98–107)
CO2 SERPL-SCNC: 24 MMOL/L — SIGNIFICANT CHANGE UP (ref 22–31)
CREAT SERPL-MCNC: 0.45 MG/DL — LOW (ref 0.5–1.3)
GLUCOSE SERPL-MCNC: 85 MG/DL — SIGNIFICANT CHANGE UP (ref 70–99)
HCT VFR BLD CALC: 43.4 % — SIGNIFICANT CHANGE UP (ref 34.5–45)
HGB BLD-MCNC: 13.3 G/DL — SIGNIFICANT CHANGE UP (ref 11.5–15.5)
MAGNESIUM SERPL-MCNC: 2 MG/DL — SIGNIFICANT CHANGE UP (ref 1.6–2.6)
MCHC RBC-ENTMCNC: 24.5 PG — LOW (ref 27–34)
MCHC RBC-ENTMCNC: 30.6 GM/DL — LOW (ref 32–36)
MCV RBC AUTO: 80.1 FL — SIGNIFICANT CHANGE UP (ref 80–100)
NRBC # BLD: 0 /100 WBCS — SIGNIFICANT CHANGE UP
NRBC # FLD: 0 K/UL — SIGNIFICANT CHANGE UP
PHOSPHATE SERPL-MCNC: 2.9 MG/DL — SIGNIFICANT CHANGE UP (ref 2.5–4.5)
PLATELET # BLD AUTO: 389 K/UL — SIGNIFICANT CHANGE UP (ref 150–400)
POTASSIUM SERPL-MCNC: 3.6 MMOL/L — SIGNIFICANT CHANGE UP (ref 3.5–5.3)
POTASSIUM SERPL-SCNC: 3.6 MMOL/L — SIGNIFICANT CHANGE UP (ref 3.5–5.3)
PROT SERPL-MCNC: 6 G/DL — SIGNIFICANT CHANGE UP (ref 6–8.3)
RBC # BLD: 5.42 M/UL — HIGH (ref 3.8–5.2)
RBC # FLD: 14.1 % — SIGNIFICANT CHANGE UP (ref 10.3–14.5)
SODIUM SERPL-SCNC: 138 MMOL/L — SIGNIFICANT CHANGE UP (ref 135–145)
WBC # BLD: 8.93 K/UL — SIGNIFICANT CHANGE UP (ref 3.8–10.5)
WBC # FLD AUTO: 8.93 K/UL — SIGNIFICANT CHANGE UP (ref 3.8–10.5)

## 2021-04-08 RX ORDER — ALPRAZOLAM 0.25 MG
0.5 TABLET ORAL
Refills: 0 | Status: DISCONTINUED | OUTPATIENT
Start: 2021-04-08 | End: 2021-04-10

## 2021-04-08 RX ADMIN — ENOXAPARIN SODIUM 40 MILLIGRAM(S): 100 INJECTION SUBCUTANEOUS at 13:20

## 2021-04-08 RX ADMIN — Medication 6 MILLIGRAM(S): at 06:28

## 2021-04-08 RX ADMIN — TIOTROPIUM BROMIDE 1 CAPSULE(S): 18 CAPSULE ORAL; RESPIRATORY (INHALATION) at 13:21

## 2021-04-08 RX ADMIN — REMDESIVIR 500 MILLIGRAM(S): 5 INJECTION INTRAVENOUS at 13:20

## 2021-04-08 NOTE — DISCHARGE NOTE PROVIDER - NSDCMRMEDTOKEN_GEN_ALL_CORE_FT
Advair Diskus 500 mcg-50 mcg inhalation powder: 1 puff(s) inhaled 2 times a day   Advair Diskus 500 mcg-50 mcg inhalation powder: 1 puff(s) inhaled 2 times a day  dexamethasone 6 mg oral tablet: 1 tab(s) orally once a day x 3 days  tiotropium 18 mcg inhalation capsule: 1 cap(s) inhaled every 24 hours  Xarelto 10 mg oral tablet: 1 tab(s) orally once a day

## 2021-04-08 NOTE — DISCHARGE NOTE PROVIDER - HOSPITAL COURSE
62 Y/O F w/ PMH of COPD on Advair (not o2 dependent) presents to ER p/w dyspnea x 7 days, worse last night. Pt initially developed productive cough with clear phlegm and diarrhea, about 3 episodes/day. Symptoms then accompanied with CASTRO with ADLs.  Pt diagnosed w/ Covid 10 days ago, reports her sister is sick with COVID. Yesterday, pt went to [facility] and received IV Monoclonal Antibody therapy. However, last night dyspnea worsened, now dyspnea with minimal exertion. Pt checked her O2 sat at home found to be 89% on room air. She also admits to weakness, poor appetite, chills and dizziness upon ambulation. She denies fever, chest pain, palpitations, vomiting or abdominal pain. Pt asked her son in law to bring to [facility] ED. In ED pt was hypoxic to 83%  on ambulation, was given IV Decadron with some relief.     Patient was admitted for COVID. CXR showed left lower peripheral lung hazy opacity consistent with COVID. Patient was placed on O2 supplementation. Patient was started on dexamethasone and remdesivir. Patinet was weaned to _____.     On ___ this case was reviewed with  ____, the patient is medically stable and optimized for discharge. All medications were reviewed and prescriptions were sent to mutually agreed upon pharmacy.             60 Y/O F w/ PMH of COPD on Advair (not o2 dependent) presents to ER p/w dyspnea x 7 days, worse last night. Pt initially developed productive cough with clear phlegm and diarrhea, about 3 episodes/day. Symptoms then accompanied with CASTRO with ADLs.  Pt diagnosed w/ Covid 10 days ago, reports her sister is sick with COVID. Yesterday, pt went to [facility] and received IV Monoclonal Antibody therapy. However, last night dyspnea worsened, now dyspnea with minimal exertion. Pt checked her O2 sat at home found to be 89% on room air. She also admits to weakness, poor appetite, chills and dizziness upon ambulation. She denies fever, chest pain, palpitations, vomiting or abdominal pain. Pt asked her son in law to bring to [facility] ED. In ED pt was hypoxic to 83%  on ambulation, was given IV Decadron with some relief.     Patient was admitted for COVID. CXR showed left lower peripheral lung hazy opacity consistent with COVID. Patient was placed on O2 supplementation. Patient was started on dexamethasone and remdesivir. Patinet was weaned to __RA___. Discharged on Dexamethasone 6mg daily x 3 more day to complete course and Xarelto 10mg daily x 30 days     On _4/10__ this case was reviewed with Dr. Villavicencio, the patient is medically stable and optimized for discharge. All medications were reviewed and prescriptions were sent to mutually agreed upon pharmacy.

## 2021-04-08 NOTE — DISCHARGE NOTE PROVIDER - PROVIDER TOKENS
PROVIDER:[TOKEN:[[id number]]] PROVIDER:[TOKEN:[[id number]]],PROVIDER:[TOKEN:[[id number]],FOLLOWUP:[2 weeks]]

## 2021-04-08 NOTE — DISCHARGE NOTE PROVIDER - NSDCCPCAREPLAN_GEN_ALL_CORE_FT
PRINCIPAL DISCHARGE DIAGNOSIS  Diagnosis: COVID-19  Assessment and Plan of Treatment: You have been diagnosed with the COVID-19 virus during your hospital stay. You must self quarantine to complete a 14 day time period.  Monitor for fevers, shortness of breath and cough primarily.  Monitor your temperature daily to not any changes and increases.    It has been determined that you no longer need hospitalization and can recover while remaining in self-quarantine at home. You should follow the prevention steps below until a healthcare provider or local or state health department says you can return to your normal activities.  1. You should restrict activities outside your home, except for getting medical care.  2. Do not go to work, school, or public areas.  3. Avoid using public transportation, ride-sharing, or taxis.  4. Separate yourself from other people and animals in your home.  5. Call ahead before visiting your doctor.  6. Wear a facemask.  7. Cover your coughs and sneezes.  8. Clean your hands often.  9. Avoid sharing personal household items.  10. Clean all “high-touch” surfaces everyday.  11. Monitor your symptoms.  If you have a medical emergency and need to call 911, notify the dispatch personnel that you have COVID-19 If possible, put on a facemask before emergency medical services arrive.  12. Stopping home isolation.  Patients with confirmed COVID-19 should remain under home isolation precautions for 14 days since the positive COVID-19 test and until the risk of secondary transmission to others is thought to be low. The decision to discontinue home isolation precautions should be made on a case-by-case basis, in consultation with healthcare providers and state and local health departments. Your [facility] Department of Health can be reached at [phone] for further information about COVID-19.       PRINCIPAL DISCHARGE DIAGNOSIS  Diagnosis: COVID-19  Assessment and Plan of Treatment: Complete steriods x 3 more days  Take Xarelto 10mg once a day x 30 days   You have been diagnosed with the COVID-19 virus during your hospital stay. You must self quarantine to complete a 14 day time period.  Monitor for fevers, shortness of breath and cough primarily.  Monitor your temperature daily to not any changes and increases.    It has been determined that you no longer need hospitalization and can recover while remaining in self-quarantine at home. You should follow the prevention steps below until a healthcare provider or local or state health department says you can return to your normal activities.  1. You should restrict activities outside your home, except for getting medical care.  2. Do not go to work, school, or public areas.  3. Avoid using public transportation, ride-sharing, or taxis.  4. Separate yourself from other people and animals in your home.  5. Call ahead before visiting your doctor.  6. Wear a facemask.  7. Cover your coughs and sneezes.  8. Clean your hands often.  9. Avoid sharing personal household items.  10. Clean all “high-touch” surfaces everyday.  11. Monitor your symptoms.  If you have a medical emergency and need to call 911, notify the dispatch personnel that you have COVID-19 If possible, put on a facemask before emergency medical services arrive.  12. Stopping home isolation.  Patients with confirmed COVID-19 should remain under home isolation precautions for 14 days since the positive COVID-19 test and until the risk of secondary transmission to others is thought to be low. The decision to discontinue home isolation precautions should be made on a case-by-case basis, in consultation with healthcare providers and state and local health departments. Your [facility] Department of Health can be reached at [phone] for further information about COVID-19.

## 2021-04-08 NOTE — DISCHARGE NOTE PROVIDER - CARE PROVIDER_API CALL
Donnell Larson  CARDIOVASCULAR DISEASE  [address], [address]  [place] [id number]  Phone: [phone]  Fax: [phone]  Follow Up Time:    Donnell Larson  CARDIOVASCULAR DISEASE  [address], [address]  [place] [address]  Phone: [phone]  Fax: [phone]  Follow Up Time:     Vitaly Villavicencio ()  [address] [address]  Phone: [phone]  Fax: [phone]  Follow Up Time: 2 weeks

## 2021-04-09 LAB
ALBUMIN SERPL ELPH-MCNC: 3.1 G/DL — LOW (ref 3.3–5)
ALP SERPL-CCNC: 65 U/L — SIGNIFICANT CHANGE UP (ref 40–120)
ALT FLD-CCNC: 23 U/L — SIGNIFICANT CHANGE UP (ref 4–33)
ANION GAP SERPL CALC-SCNC: 10 MMOL/L — SIGNIFICANT CHANGE UP (ref 7–14)
AST SERPL-CCNC: 18 U/L — SIGNIFICANT CHANGE UP (ref 4–32)
BILIRUB SERPL-MCNC: 0.4 MG/DL — SIGNIFICANT CHANGE UP (ref 0.2–1.2)
BUN SERPL-MCNC: 19 MG/DL — SIGNIFICANT CHANGE UP (ref 7–23)
CALCIUM SERPL-MCNC: 8.7 MG/DL — SIGNIFICANT CHANGE UP (ref 8.4–10.5)
CHLORIDE SERPL-SCNC: 101 MMOL/L — SIGNIFICANT CHANGE UP (ref 98–107)
CO2 SERPL-SCNC: 25 MMOL/L — SIGNIFICANT CHANGE UP (ref 22–31)
CREAT SERPL-MCNC: 0.51 MG/DL — SIGNIFICANT CHANGE UP (ref 0.5–1.3)
GLUCOSE SERPL-MCNC: 95 MG/DL — SIGNIFICANT CHANGE UP (ref 70–99)
HCT VFR BLD CALC: 33.4 % — LOW (ref 34.5–45)
HGB BLD-MCNC: 12.2 G/DL — SIGNIFICANT CHANGE UP (ref 11.5–15.5)
MAGNESIUM SERPL-MCNC: 2.2 MG/DL — SIGNIFICANT CHANGE UP (ref 1.6–2.6)
MCHC RBC-ENTMCNC: 31.7 PG — SIGNIFICANT CHANGE UP (ref 27–34)
MCHC RBC-ENTMCNC: 36.5 GM/DL — HIGH (ref 32–36)
MCV RBC AUTO: 86.8 FL — SIGNIFICANT CHANGE UP (ref 80–100)
NRBC # BLD: 0 /100 WBCS — SIGNIFICANT CHANGE UP
NRBC # FLD: 0 K/UL — SIGNIFICANT CHANGE UP
PHOSPHATE SERPL-MCNC: 3.2 MG/DL — SIGNIFICANT CHANGE UP (ref 2.5–4.5)
PLATELET # BLD AUTO: 516 K/UL — HIGH (ref 150–400)
POTASSIUM SERPL-MCNC: 4.1 MMOL/L — SIGNIFICANT CHANGE UP (ref 3.5–5.3)
POTASSIUM SERPL-SCNC: 4.1 MMOL/L — SIGNIFICANT CHANGE UP (ref 3.5–5.3)
PROT SERPL-MCNC: 5.9 G/DL — LOW (ref 6–8.3)
RBC # BLD: 3.85 M/UL — SIGNIFICANT CHANGE UP (ref 3.8–5.2)
RBC # FLD: 16.9 % — HIGH (ref 10.3–14.5)
SODIUM SERPL-SCNC: 136 MMOL/L — SIGNIFICANT CHANGE UP (ref 135–145)
WBC # BLD: 9.74 K/UL — SIGNIFICANT CHANGE UP (ref 3.8–10.5)
WBC # FLD AUTO: 9.74 K/UL — SIGNIFICANT CHANGE UP (ref 3.8–10.5)

## 2021-04-09 RX ADMIN — Medication 6 MILLIGRAM(S): at 06:01

## 2021-04-09 RX ADMIN — ENOXAPARIN SODIUM 40 MILLIGRAM(S): 100 INJECTION SUBCUTANEOUS at 11:47

## 2021-04-09 RX ADMIN — BUDESONIDE AND FORMOTEROL FUMARATE DIHYDRATE 2 PUFF(S): 160; 4.5 AEROSOL RESPIRATORY (INHALATION) at 09:09

## 2021-04-09 RX ADMIN — TIOTROPIUM BROMIDE 1 CAPSULE(S): 18 CAPSULE ORAL; RESPIRATORY (INHALATION) at 11:47

## 2021-04-10 ENCOUNTER — TRANSCRIPTION ENCOUNTER (OUTPATIENT)
Age: 61
End: 2021-04-10

## 2021-04-10 VITALS
RESPIRATION RATE: 20 BRPM | TEMPERATURE: 98 F | DIASTOLIC BLOOD PRESSURE: 75 MMHG | SYSTOLIC BLOOD PRESSURE: 125 MMHG | OXYGEN SATURATION: 95 % | HEART RATE: 69 BPM

## 2021-04-10 LAB
ALBUMIN SERPL ELPH-MCNC: 3.2 G/DL — LOW (ref 3.3–5)
ALP SERPL-CCNC: 78 U/L — SIGNIFICANT CHANGE UP (ref 40–120)
ALT FLD-CCNC: 24 U/L — SIGNIFICANT CHANGE UP (ref 4–33)
ANION GAP SERPL CALC-SCNC: 11 MMOL/L — SIGNIFICANT CHANGE UP (ref 7–14)
AST SERPL-CCNC: 21 U/L — SIGNIFICANT CHANGE UP (ref 4–32)
BILIRUB SERPL-MCNC: 0.4 MG/DL — SIGNIFICANT CHANGE UP (ref 0.2–1.2)
BUN SERPL-MCNC: 17 MG/DL — SIGNIFICANT CHANGE UP (ref 7–23)
CALCIUM SERPL-MCNC: 8.9 MG/DL — SIGNIFICANT CHANGE UP (ref 8.4–10.5)
CHLORIDE SERPL-SCNC: 103 MMOL/L — SIGNIFICANT CHANGE UP (ref 98–107)
CO2 SERPL-SCNC: 23 MMOL/L — SIGNIFICANT CHANGE UP (ref 22–31)
CREAT SERPL-MCNC: 0.55 MG/DL — SIGNIFICANT CHANGE UP (ref 0.5–1.3)
CRP SERPL-MCNC: 8.3 MG/L — HIGH
D DIMER BLD IA.RAPID-MCNC: 520 NG/ML DDU — HIGH
FERRITIN SERPL-MCNC: 429 NG/ML — HIGH (ref 15–150)
GLUCOSE SERPL-MCNC: 87 MG/DL — SIGNIFICANT CHANGE UP (ref 70–99)
HCT VFR BLD CALC: 40 % — SIGNIFICANT CHANGE UP (ref 34.5–45)
HGB BLD-MCNC: 13.7 G/DL — SIGNIFICANT CHANGE UP (ref 11.5–15.5)
LDH SERPL L TO P-CCNC: 370 U/L — HIGH (ref 135–225)
MCHC RBC-ENTMCNC: 28.4 PG — SIGNIFICANT CHANGE UP (ref 27–34)
MCHC RBC-ENTMCNC: 34.3 GM/DL — SIGNIFICANT CHANGE UP (ref 32–36)
MCV RBC AUTO: 82.8 FL — SIGNIFICANT CHANGE UP (ref 80–100)
NRBC # BLD: 0 /100 WBCS — SIGNIFICANT CHANGE UP
NRBC # FLD: 0 K/UL — SIGNIFICANT CHANGE UP
PLATELET # BLD AUTO: 571 K/UL — HIGH (ref 150–400)
POTASSIUM SERPL-MCNC: 4.4 MMOL/L — SIGNIFICANT CHANGE UP (ref 3.5–5.3)
POTASSIUM SERPL-SCNC: 4.4 MMOL/L — SIGNIFICANT CHANGE UP (ref 3.5–5.3)
PROCALCITONIN SERPL-MCNC: 0.05 NG/ML — SIGNIFICANT CHANGE UP (ref 0.02–0.1)
PROT SERPL-MCNC: 6.8 G/DL — SIGNIFICANT CHANGE UP (ref 6–8.3)
RBC # BLD: 4.83 M/UL — SIGNIFICANT CHANGE UP (ref 3.8–5.2)
RBC # FLD: 16 % — HIGH (ref 10.3–14.5)
SODIUM SERPL-SCNC: 137 MMOL/L — SIGNIFICANT CHANGE UP (ref 135–145)
WBC # BLD: 11.86 K/UL — HIGH (ref 3.8–10.5)
WBC # FLD AUTO: 11.86 K/UL — HIGH (ref 3.8–10.5)

## 2021-04-10 RX ORDER — TIOTROPIUM BROMIDE 18 UG/1
1 CAPSULE ORAL; RESPIRATORY (INHALATION)
Qty: 1 | Refills: 0
Start: 2021-04-10 | End: 2021-05-09

## 2021-04-10 RX ORDER — RIVAROXABAN 15 MG-20MG
1 KIT ORAL
Qty: 30 | Refills: 0
Start: 2021-04-10 | End: 2021-05-09

## 2021-04-10 RX ORDER — DEXAMETHASONE 0.5 MG/5ML
1 ELIXIR ORAL
Qty: 3 | Refills: 0
Start: 2021-04-10 | End: 2021-04-12

## 2021-04-10 RX ADMIN — ENOXAPARIN SODIUM 40 MILLIGRAM(S): 100 INJECTION SUBCUTANEOUS at 12:21

## 2021-04-10 RX ADMIN — TIOTROPIUM BROMIDE 1 CAPSULE(S): 18 CAPSULE ORAL; RESPIRATORY (INHALATION) at 12:20

## 2021-04-10 RX ADMIN — BUDESONIDE AND FORMOTEROL FUMARATE DIHYDRATE 2 PUFF(S): 160; 4.5 AEROSOL RESPIRATORY (INHALATION) at 10:08

## 2021-04-10 RX ADMIN — Medication 6 MILLIGRAM(S): at 06:27

## 2021-04-10 NOTE — CHART NOTE - NSCHARTNOTEFT_GEN_A_CORE
Spoke with Dr. Villavicencio, patient cleared for discharge today. No requirements for supplemental O2.   Will discharge on Dexamethasone 6mg PO daily x 3 days. Course completed on 4/13  Will discharge on Xarelto 10mg daily x 30 days per attending  Patient can follow up with Dr. Villaviecncio in 2 weeks or own PCP.       Sariah Saldana NP-C  [phone] work cell  [facility]

## 2021-04-10 NOTE — PROGRESS NOTE ADULT - PROBLEM SELECTOR PLAN 3
Continuous SpO2 monitoring  -continue Advair  -added Spiriva daily  - check Procal if febrile

## 2021-04-10 NOTE — PROGRESS NOTE ADULT - PROBLEM SELECTOR PLAN 4
Lovenox 40mg SC Q24h    Differential diagnosis and plan of care discussed with patient after the evaluation.   Advanced care planning was discussed with patient.  Advanced care planning forms were reviewed and discussed.  OMT on six regions for acute somatic dysfunctions done at the bedside   Pain assessed and judicious use of narcotics when appropriate was discussed.  Importance of Fall prevention discussed.  Counseling on Smoking and Alcohol cessation was offered when appropriate.  Counseling on Diet, exercise, and medication compliance was done.

## 2021-04-10 NOTE — PROGRESS NOTE ADULT - SUBJECTIVE AND OBJECTIVE BOX
Name of Patient : GERMÁN TRONCOSO  MRN: [id number]  DATE OF SERVICE: 04-06-21     Subjective: Patient seen and examined. No new events except as noted.   cont to require O2 on NC 4L   Anxious  xanax give for anxiety      REVIEW OF SYSTEMS:    CONSTITUTIONAL: No weakness, fevers or chills  EYES/ENT: No visual changes;  No vertigo or throat pain   NECK: No pain or stiffness  RESPIRATORY: No cough, wheezing, hemoptysis; No shortness of breath  CARDIOVASCULAR: No chest pain or palpitations  GASTROINTESTINAL: No abdominal or epigastric pain.   GENITOURINARY: No dysuria, frequency or hematuria  NEUROLOGICAL: No numbness or weakness  SKIN: No itching, burning, rashes, or lesions   All other review of systems is negative unless indicated above.    MEDICATIONS:  MEDICATIONS  (STANDING):  budesonide 160 MICROgram(s)/formoterol 4.5 MICROgram(s) Inhaler 2 Puff(s) Inhalation two times a day  dexAMETHasone  Injectable 6 milliGRAM(s) IV Push daily  enoxaparin Injectable 40 milliGRAM(s) SubCutaneous daily  potassium chloride  10 mEq/100 mL IVPB 10 milliEquivalent(s) IV Intermittent every 1 hour  remdesivir  IVPB   IV Intermittent   remdesivir  IVPB 100 milliGRAM(s) IV Intermittent every 24 hours  tiotropium 18 MICROgram(s) Capsule 1 Capsule(s) Inhalation every 24 hours      PHYSICAL EXAM:  T(C): 36.6 (04-06-21 @ 21:34), Max: 36.8 (04-06-21 @ 05:27)  HR: 74 (04-06-21 @ 21:34) (74 - 87)  BP: 139/79 (04-06-21 @ 21:34) (120/67 - 139/79)  RR: 19 (04-06-21 @ 21:34) (18 - 19)  SpO2: 95% (04-06-21 @ 21:34) (95% - 96%)  Wt(kg): --  I&O's Summary    05 Apr 2021 07:01  -  06 Apr 2021 07:00  --------------------------------------------------------  IN: 750 mL / OUT: 0 mL / NET: 750 mL          Appearance: Normal	  HEENT:  PERRLA   Lymphatic: No lymphadenopathy   Cardiovascular: Normal S1 S2, no JVD  Respiratory: normal effort , clear  Gastrointestinal:  Soft, Non-tender  Skin: No rashes,  warm to touch  Psychiatry:  Mood & affect appropriate  Musculuskeletal: No edema      All labs, Imaging and EKGs personally reviewed       04-05-21 @ 07:01  -  04-06-21 @ 07:00  --------------------------------------------------------  IN: 750 mL / OUT: 0 mL / NET: 750 mL                          12.1   12.12 )-----------( 337      ( 06 Apr 2021 08:22 )             37.8               04-06    139  |  104  |  21  ----------------------------<  111<H>  3.8   |  23  |  0.44<L>    Ca    8.8      06 Apr 2021 08:22  Phos  3.3     04-06  Mg     2.2     04-06    TPro  6.4  /  Alb  3.1<L>  /  TBili  0.4  /  DBili  x   /  AST  24  /  ALT  21  /  AlkPhos  58  04-06                                 
Name of Patient : GERMÁN TRONCOSO  MRN: [id number]  DATE OF SERVICE: 04-08-21 @ 18:31    Subjective: Patient seen and examined. No new events except as noted.   cont to require 2-3 L NC 'hypoxic with no o2 and ambulation   anxiety has improved       REVIEW OF SYSTEMS:    CONSTITUTIONAL: No weakness, fevers or chills  EYES/ENT: No visual changes;  No vertigo or throat pain   NECK: No pain or stiffness  RESPIRATORY: SOB and chets discomfort in RA   CARDIOVASCULAR: No chest pain or palpitations  GASTROINTESTINAL: No abdominal or epigastric pain. No nausea, vomiting, or hematemesis; No diarrhea or constipation. No melena or hematochezia.  GENITOURINARY: No dysuria, frequency or hematuria  NEUROLOGICAL: No numbness or weakness  SKIN: No itching, burning, rashes, or lesions   All other review of systems is negative unless indicated above.    MEDICATIONS:  MEDICATIONS  (STANDING):  budesonide 160 MICROgram(s)/formoterol 4.5 MICROgram(s) Inhaler 2 Puff(s) Inhalation two times a day  dexAMETHasone  Injectable 6 milliGRAM(s) IV Push daily  enoxaparin Injectable 40 milliGRAM(s) SubCutaneous daily  potassium chloride  10 mEq/100 mL IVPB 10 milliEquivalent(s) IV Intermittent every 1 hour  tiotropium 18 MICROgram(s) Capsule 1 Capsule(s) Inhalation every 24 hours      PHYSICAL EXAM:  T(C): 36.6 (04-08-21 @ 09:45), Max: 36.7 (04-08-21 @ 06:28)  HR: 75 (04-08-21 @ 09:45) (66 - 81)  BP: 134/74 (04-08-21 @ 09:45) (123/71 - 134/74)  RR: 20 (04-08-21 @ 09:45) (17 - 20)  SpO2: 90% (04-08-21 @ 09:45) (90% - 95%)  Wt(kg): --  I&O's Summary    07 Apr 2021 07:01  -  08 Apr 2021 07:00  --------------------------------------------------------  IN: 530 mL / OUT: 0 mL / NET: 530 mL          Appearance: Normal	  HEENT:  PERRLA   Lymphatic: No lymphadenopathy   Cardiovascular: Normal S1 S2, no JVD  Respiratory: normal effort , clear  Gastrointestinal:  Soft, Non-tender  Skin: No rashes,  warm to touch  Psychiatry:  Mood & affect appropriate  Musculuskeletal: No edema      All labs, Imaging and EKGs personally reviewed       04-07-21 @ 07:01  -  04-08-21 @ 07:00  --------------------------------------------------------  IN: 530 mL / OUT: 0 mL / NET: 530 mL                          13.3   8.93  )-----------( 389      ( 08 Apr 2021 06:52 )             43.4               04-08    138  |  103  |  15  ----------------------------<  85  3.6   |  24  |  0.45<L>    Ca    8.3<L>      08 Apr 2021 06:52  Phos  2.9     04-08  Mg     2.0     04-08    TPro  6.0  /  Alb  2.9<L>  /  TBili  0.4  /  DBili  x   /  AST  19  /  ALT  22  /  AlkPhos  62  04-08                                 
Name of Patient : GERMÁN TRONCOSO  MRN: [id number]  DATE OF SERVICE: 04-09-21 @ 20:35    Subjective: Patient seen and examined. No new events except as noted.   hypoxic room air  SOB with ambulaiton off O2   no chest pain  anxiety resolved     REVIEW OF SYSTEMS:    CONSTITUTIONAL: No weakness, fevers or chills  EYES/ENT: No visual changes;  No vertigo or throat pain   NECK: No pain or stiffness  RESPIRATORY: SOBa nd dyspnea with no O2   CARDIOVASCULAR: No chest pain or palpitations  GASTROINTESTINAL: No abdominal or epigastric pain. No nausea, vomiting, or hematemesis; No diarrhea or constipation. No melena or hematochezia.  GENITOURINARY: No dysuria, frequency or hematuria  NEUROLOGICAL: No numbness or weakness  SKIN: No itching, burning, rashes, or lesions   All other review of systems is negative unless indicated above.    MEDICATIONS:  MEDICATIONS  (STANDING):  budesonide 160 MICROgram(s)/formoterol 4.5 MICROgram(s) Inhaler 2 Puff(s) Inhalation two times a day  dexAMETHasone  Injectable 6 milliGRAM(s) IV Push daily  enoxaparin Injectable 40 milliGRAM(s) SubCutaneous daily  potassium chloride  10 mEq/100 mL IVPB 10 milliEquivalent(s) IV Intermittent every 1 hour  tiotropium 18 MICROgram(s) Capsule 1 Capsule(s) Inhalation every 24 hours      PHYSICAL EXAM:  T(C): 36.8 (04-09-21 @ 18:50), Max: 36.8 (04-08-21 @ 22:37)  HR: 73 (04-09-21 @ 18:50) (73 - 92)  BP: 118/71 (04-09-21 @ 18:50) (118/71 - 134/70)  RR: 18 (04-09-21 @ 18:50) (18 - 18)  SpO2: 94% (04-09-21 @ 18:50) (91% - 99%)  Wt(kg): --  I&O's Summary        Appearance: Normal	  HEENT:  PERRLA   Lymphatic: No lymphadenopathy   Cardiovascular: Normal S1 S2, no JVD  Respiratory: normal effort , clear  Gastrointestinal:  Soft, Non-tender  Skin: No rashes,  warm to touch  Psychiatry:  Mood & affect appropriate  Musculuskeletal: No edema      All labs, Imaging and EKGs personally reviewed                               12.2   9.74  )-----------( 516      ( 09 Apr 2021 06:54 )             33.4               04-09    136  |  101  |  19  ----------------------------<  95  4.1   |  25  |  0.51    Ca    8.7      09 Apr 2021 06:54  Phos  3.2     04-09  Mg     2.2     04-09    TPro  5.9<L>  /  Alb  3.1<L>  /  TBili  0.4  /  DBili  x   /  AST  18  /  ALT  23  /  AlkPhos  65  04-09                                       
Name of Patient : GERMÁN TRONCOSO  MRN: [id number]  DATE OF SERVICE: 04-07-21 @ 17:11    Subjective: Patient seen and examined. No new events except as noted.   doing better today  less anxious   on 2-3 L NC, saturating 92-94  feeling SOB on ambulation       REVIEW OF SYSTEMS:    CONSTITUTIONAL: No weakness, fevers or chills  EYES/ENT: No visual changes;  No vertigo or throat pain   NECK: No pain or stiffness  RESPIRATORY: No cough, wheezing, hemoptysis; No shortness of breath  CARDIOVASCULAR: No chest pain or palpitations  GASTROINTESTINAL: No abdominal or epigastric pain. No nausea, vomiting, or hematemesis; No diarrhea or constipation. No melena or hematochezia.  GENITOURINARY: No dysuria, frequency or hematuria  NEUROLOGICAL: No numbness or weakness  SKIN: No itching, burning, rashes, or lesions   All other review of systems is negative unless indicated above.    MEDICATIONS:  MEDICATIONS  (STANDING):  budesonide 160 MICROgram(s)/formoterol 4.5 MICROgram(s) Inhaler 2 Puff(s) Inhalation two times a day  dexAMETHasone  Injectable 6 milliGRAM(s) IV Push daily  enoxaparin Injectable 40 milliGRAM(s) SubCutaneous daily  potassium chloride  10 mEq/100 mL IVPB 10 milliEquivalent(s) IV Intermittent every 1 hour  remdesivir  IVPB   IV Intermittent   remdesivir  IVPB 100 milliGRAM(s) IV Intermittent every 24 hours  tiotropium 18 MICROgram(s) Capsule 1 Capsule(s) Inhalation every 24 hours      PHYSICAL EXAM:  T(C): 36.7 (04-07-21 @ 09:45), Max: 36.7 (04-07-21 @ 05:50)  HR: 71 (04-07-21 @ 09:45) (69 - 74)  BP: 139/73 (04-07-21 @ 09:45) (124/87 - 139/79)  RR: 18 (04-07-21 @ 09:45) (18 - 19)  SpO2: 93% (04-07-21 @ 09:45) (93% - 96%)  Wt(kg): --  I&O's Summary        Appearance: Normal	  HEENT:  PERRLA   Lymphatic: No lymphadenopathy   Cardiovascular: Normal S1 S2, no JVD  Respiratory: normal effort , clear  Gastrointestinal:  Soft, Non-tender  Skin: No rashes,  warm to touch  Psychiatry:  Mood & affect appropriate  Musculuskeletal: No edema      All labs, Imaging and EKGs personally reviewed                             12.4   11.83 )-----------( 428      ( 07 Apr 2021 06:57 )             39.3               04-07    140  |  106  |  19  ----------------------------<  93  4.0   |  22  |  0.49<L>    Ca    8.7      07 Apr 2021 06:57  Phos  3.3     04-06  Mg     2.2     04-06    TPro  6.6  /  Alb  3.0<L>  /  TBili  0.4  /  DBili  x   /  AST  23  /  ALT  22  /  AlkPhos  65  04-07                                         
Name of Patient : GERMÁN TRONCOSO  MRN: [id number]  DATE OF SERVICE: 04-05-21 @ 16:34    Subjective: Patient seen and examined. No new events except as noted.   doing okay  on 3-4 L NC for O2 supplement   SOB with ambulation     REVIEW OF SYSTEMS:    CONSTITUTIONAL: No weakness, fevers or chills  EYES/ENT: No visual changes;  No vertigo or throat pain   NECK: No pain or stiffness  RESPIRATORY: SOB on ambulation   CARDIOVASCULAR: No chest pain or palpitations  GASTROINTESTINAL: No abdominal or epigastric pain. No nausea, vomiting, or hematemesis; No diarrhea or constipation. No melena or hematochezia.  GENITOURINARY: No dysuria, frequency or hematuria  NEUROLOGICAL: No numbness or weakness  SKIN: No itching, burning, rashes, or lesions   All other review of systems is negative unless indicated above.    MEDICATIONS:  MEDICATIONS  (STANDING):  budesonide 160 MICROgram(s)/formoterol 4.5 MICROgram(s) Inhaler 2 Puff(s) Inhalation two times a day  dexAMETHasone  Injectable 6 milliGRAM(s) IV Push daily  enoxaparin Injectable 40 milliGRAM(s) SubCutaneous daily  potassium chloride  10 mEq/100 mL IVPB 10 milliEquivalent(s) IV Intermittent every 1 hour  remdesivir  IVPB   IV Intermittent   remdesivir  IVPB 100 milliGRAM(s) IV Intermittent every 24 hours  tiotropium 18 MICROgram(s) Capsule 1 Capsule(s) Inhalation every 24 hours      PHYSICAL EXAM:  T(C): 36.5 (04-05-21 @ 11:09), Max: 36.8 (04-05-21 @ 05:23)  HR: 84 (04-05-21 @ 11:09) (77 - 90)  BP: 132/67 (04-05-21 @ 11:09) (119/76 - 134/77)  RR: 18 (04-05-21 @ 11:09) (17 - 20)  SpO2: 94% (04-05-21 @ 11:09) (94% - 98%)  Wt(kg): --  I&O's Summary    05 Apr 2021 07:01  -  05 Apr 2021 16:34  --------------------------------------------------------  IN: 500 mL / OUT: 0 mL / NET: 500 mL          Appearance: Normal	  HEENT:  PERRLA   Lymphatic: No lymphadenopathy   Cardiovascular: Normal S1 S2, no JVD  Respiratory: normal effort , clear  Gastrointestinal:  Soft, Non-tender  Skin: No rashes,  warm to touch  Psychiatry:  Mood & affect appropriate  Musculuskeletal: No edema      All labs, Imaging and EKGs personally reviewed                             13.6   6.93  )-----------( 189      ( 04 Apr 2021 09:15 )             44.1               04-05    140  |  105  |  11  ----------------------------<  124<H>  4.2   |  22  |  0.41<L>    Ca    8.6      05 Apr 2021 08:00  Mg     1.8     04-04    TPro  6.6  /  Alb  3.1<L>  /  TBili  0.5  /  DBili  x   /  AST  27  /  ALT  20  /  AlkPhos  56  04-05    PT/INR - ( 04 Apr 2021 09:15 )   PT: 13.6 sec;   INR: 1.19 ratio         PTT - ( 04 Apr 2021 09:15 )  PTT:28.5 sec                               04-05-21 @ 07:01  -  04-05-21 @ 16:34  --------------------------------------------------------  IN: 500 mL / OUT: 0 mL / NET: 500 mL            
Name of Patient : GERMÁN TRONCOSO  MRN: [id number]  DATE OF SERVICE: 04-10-21    Subjective: Patient seen and examined. No new events except as noted.   doing well   D/ cPlaning  Saturating well n RA with rest and ambulation     REVIEW OF SYSTEMS:    CONSTITUTIONAL: No weakness, fevers or chills  EYES/ENT: No visual changes;  No vertigo or throat pain   NECK: No pain or stiffness  RESPIRATORY: No cough, wheezing, hemoptysis; No shortness of breath  CARDIOVASCULAR: No chest pain or palpitations  GASTROINTESTINAL: No abdominal or epigastric pain. No nausea, vomiting, or hematemesis; No diarrhea or constipation. No melena or hematochezia.  GENITOURINARY: No dysuria, frequency or hematuria  NEUROLOGICAL: No numbness or weakness  SKIN: No itching, burning, rashes, or lesions   All other review of systems is negative unless indicated above.    MEDICATIONS:  MEDICATIONS  (STANDING):  budesonide 160 MICROgram(s)/formoterol 4.5 MICROgram(s) Inhaler 2 Puff(s) Inhalation two times a day  dexAMETHasone  Injectable 6 milliGRAM(s) IV Push daily  enoxaparin Injectable 40 milliGRAM(s) SubCutaneous daily  potassium chloride  10 mEq/100 mL IVPB 10 milliEquivalent(s) IV Intermittent every 1 hour  tiotropium 18 MICROgram(s) Capsule 1 Capsule(s) Inhalation every 24 hours      PHYSICAL EXAM:  T(C): 36.6 (04-10-21 @ 05:45), Max: 36.6 (04-10-21 @ 05:45)  HR: 69 (04-10-21 @ 05:45) (69 - 69)  BP: 125/75 (04-10-21 @ 05:45) (125/75 - 125/75)  RR: 20 (04-10-21 @ 05:45) (20 - 20)  SpO2: 95% (04-10-21 @ 05:45) (95% - 95%)  Wt(kg): --  I&O's Summary        Appearance: Normal	  HEENT:  PERRLA   Lymphatic: No lymphadenopathy   Cardiovascular: Normal S1 S2, no JVD  Respiratory: normal effort , clear  Gastrointestinal:  Soft, Non-tender  Skin: No rashes,  warm to touch  Psychiatry:  Mood & affect appropriate  Musculuskeletal: No edema      All labs, Imaging and EKGs personally reviewed                             13.7   11.86 )-----------( 571      ( 10 Apr 2021 07:09 )             40.0               04-10    137  |  103  |  17  ----------------------------<  87  4.4   |  23  |  0.55    Ca    8.9      10 Apr 2021 07:09  Phos  3.2     04-09  Mg     2.2     04-09    TPro  6.8  /  Alb  3.2<L>  /  TBili  0.4  /  DBili  x   /  AST  21  /  ALT  24  /  AlkPhos  78  04-10

## 2021-04-10 NOTE — PROGRESS NOTE ADULT - PROBLEM SELECTOR PLAN 2
Likely in a setting of diarrhea from COVID infection  -supplementing with IV KCl  and PO KDur.  -monitor electrolytes daily  -serial EKGs

## 2021-04-10 NOTE — PROGRESS NOTE ADULT - ASSESSMENT
62 Y/O F w/ PMH of COPD on Advair presents to ER p/w dyspnea x 7 days, worse last night, admitted to medicine for COVID-19 infection.
  60 Y/O F w/ PMH of COPD on Advair presents to ER p/w dyspnea x 7 days, worse last night, admitted to medicine for COVID-19 infection.
  60 Y/O F w/ PMH of COPD on Advair presents to ER p/w dyspnea x 7 days, worse last night, admitted to medicine for COVID-19 infection.
  62 Y/O F w/ PMH of COPD on Advair presents to ER p/w dyspnea x 7 days, worse last night, admitted to medicine for COVID-19 infection.
  62 Y/O F w/ PMH of COPD on Advair presents to ER p/w dyspnea x 7 days, worse last night, admitted to medicine for COVID-19 infection.
  60 Y/O F w/ PMH of COPD on Advair presents to ER p/w dyspnea x 7 days, worse last night, admitted to medicine for COVID-19 infection.

## 2021-04-10 NOTE — PROGRESS NOTE ADULT - PROBLEM SELECTOR PLAN 1
Continuous Pulse Oxymetry  -Incentive Spirometry  -continue Supplemental Oxygen  - leukocytosis due to steroids   -monitor serum Inflammatory markers: CRP, procalcitonin and Ferritin.  -Cont IV Dexamethasone 6mg Q24h and  IV Remdesevir   -monitor LFT and Renal function  -supportive care, Chest OMT at the bedside  - Xanax for anxiety  - check O2 sat on ambulation, D dimer negative
Continuous Pulse Oxymetry  -Incentive Spirometry  -continue Supplemental Oxygen  - leukocytosis due to steroids   -monitor serum Inflammatory markers: CRP, procalcitonin and Ferritin.  -Cont IV Dexamethasone 6mg Q24h, complted  IV Remdesevir   -monitor LFT and Renal function  -supportive care, Chest OMT at the bedside  - Xanax for anxiety  - check O2 sat on ambulation, off O2 hypoxia, mid 80s and symptomatic , D dimer negative
Continuous Pulse Oxymetry  -Incentive Spirometry  -continue Supplemental Oxygen  - leukocytosis due to steroids   -monitor serum Inflammatory markers: CRP, procalcitonin and Ferritin.  -Cont IV Dexamethasone 6mg Q24h and  IV Remdesevir   -monitor LFT and Renal function  -supportive care, Chest OMT at the bedside  - Xanax for anxiety
Continuous Pulse Oxymetry  -Incentive Spirometry  -continue Supplemental Oxygen  - leukocytosis due to steroids   -monitor serum Inflammatory markers: CRP, procalcitonin and Ferritin.  -Cont IV Dexamethasone 6mg Q24h and  IV Remdesevir   -monitor LFT and Renal function  -supportive care, Chest OMT at the bedside  - Xanax for anxiety  - check O2 sat on ambulation
Continuous Pulse Oxymetry  -Incentive Spirometry  -continue Supplemental Oxygen  -monitor serum Inflammatory markers: CRP, procalcitonin and Ferritin.  -Cont IV Dexamethasone 6mg Q24h and  IV Remdesevir   -monitor LFT and Renal function  -supportive care, Chest OMT at the bedside
Continuous Pulse Oxymetry  -Incentive Spirometry  - leukocytosis due to steroids   -monitor serum Inflammatory markers: CRP, procalcitonin and Ferritin.  -Cont IV Dexamethasone 6mg Q24h, complted  IV Remdesevir   -monitor LFT and Renal function  -supportive care, Chest OMT at the bedside  - Xanax for anxiety  - O 2 sat noted, above 90 RA with rest and ambulaiton  - D? cplanign with completion of oral steroids and 30 days DVT PPX with xarelto

## 2021-04-10 NOTE — DISCHARGE NOTE NURSING/CASE MANAGEMENT/SOCIAL WORK - PATIENT PORTAL LINK FT
You can access the FollowMyHealth Patient Portal offered by [facility] by registering at the following website: [email or web address]. By joining [facility]’s FollowMyHealth portal, you will also be able to view your health information using other applications (apps) compatible with our system.

## 2021-04-11 ENCOUNTER — TRANSCRIPTION ENCOUNTER (OUTPATIENT)
Age: 61
End: 2021-04-11

## 2021-05-17 DIAGNOSIS — Z20.822 CONTACT WITH AND (SUSPECTED) EXPOSURE TO COVID-19: ICD-10-CM

## 2021-05-17 PROBLEM — J44.9 CHRONIC OBSTRUCTIVE PULMONARY DISEASE, UNSPECIFIED: Chronic | Status: ACTIVE | Noted: 2021-04-04

## 2021-05-19 ENCOUNTER — NON-APPOINTMENT (OUTPATIENT)
Age: 61
End: 2021-05-19

## 2021-06-21 ENCOUNTER — NON-APPOINTMENT (OUTPATIENT)
Age: 61
End: 2021-06-21

## 2021-06-23 ENCOUNTER — APPOINTMENT (OUTPATIENT)
Dept: PULMONOLOGY | Facility: CLINIC | Age: 61
End: 2021-06-23
Payer: COMMERCIAL

## 2021-06-23 VITALS
TEMPERATURE: 97.8 F | DIASTOLIC BLOOD PRESSURE: 92 MMHG | WEIGHT: 155 LBS | BODY MASS INDEX: 26.46 KG/M2 | HEART RATE: 89 BPM | SYSTOLIC BLOOD PRESSURE: 166 MMHG | OXYGEN SATURATION: 97 % | HEIGHT: 64 IN

## 2021-06-23 DIAGNOSIS — J44.9 CHRONIC OBSTRUCTIVE PULMONARY DISEASE, UNSPECIFIED: ICD-10-CM

## 2021-06-23 DIAGNOSIS — J12.82 COVID-19: ICD-10-CM

## 2021-06-23 DIAGNOSIS — U07.1 COVID-19: ICD-10-CM

## 2021-06-23 PROCEDURE — 99072 ADDL SUPL MATRL&STAF TM PHE: CPT

## 2021-06-23 PROCEDURE — 94010 BREATHING CAPACITY TEST: CPT

## 2021-06-23 PROCEDURE — 99205 OFFICE O/P NEW HI 60 MIN: CPT | Mod: 25

## 2021-06-23 PROCEDURE — 71046 X-RAY EXAM CHEST 2 VIEWS: CPT

## 2021-06-23 PROCEDURE — G0296 VISIT TO DETERM LDCT ELIG: CPT

## 2021-06-23 PROCEDURE — 94729 DIFFUSING CAPACITY: CPT

## 2021-06-23 PROCEDURE — ZZZZZ: CPT

## 2021-06-23 PROCEDURE — 94727 GAS DIL/WSHOT DETER LNG VOL: CPT

## 2021-07-02 LAB — DEPRECATED D DIMER PPP IA-ACNC: <150 NG/ML DDU

## 2021-08-17 ENCOUNTER — APPOINTMENT (OUTPATIENT)
Dept: OBGYN | Facility: CLINIC | Age: 61
End: 2021-08-17
Payer: COMMERCIAL

## 2021-08-17 VITALS
BODY MASS INDEX: 25.95 KG/M2 | DIASTOLIC BLOOD PRESSURE: 84 MMHG | SYSTOLIC BLOOD PRESSURE: 132 MMHG | WEIGHT: 152 LBS | HEIGHT: 64 IN

## 2021-08-17 DIAGNOSIS — Z01.419 ENCOUNTER FOR GYNECOLOGICAL EXAMINATION (GENERAL) (ROUTINE) W/OUT ABNORMAL FINDINGS: ICD-10-CM

## 2021-08-17 PROCEDURE — 99396 PREV VISIT EST AGE 40-64: CPT

## 2021-08-17 RX ORDER — TIOTROPIUM BROMIDE 18 UG/1
18 CAPSULE ORAL; RESPIRATORY (INHALATION)
Refills: 0 | Status: DISCONTINUED | COMMUNITY
End: 2021-08-17

## 2021-08-17 RX ORDER — RIVAROXABAN 10 MG/1
10 TABLET, FILM COATED ORAL
Refills: 0 | Status: DISCONTINUED | COMMUNITY
End: 2021-08-17

## 2021-08-22 RX ORDER — BUDESONIDE AND FORMOTEROL FUMARATE DIHYDRATE 160; 4.5 UG/1; UG/1
AEROSOL RESPIRATORY (INHALATION)
Refills: 0 | Status: ACTIVE | COMMUNITY

## 2021-08-24 LAB — CYTOLOGY CVX/VAG DOC THIN PREP: ABNORMAL

## 2021-10-06 PROBLEM — U07.1 PNEUMONIA DUE TO COVID-19 VIRUS: Status: ACTIVE | Noted: 2021-06-23

## 2021-10-29 NOTE — CONSULT LETTER
[Dear  ___] : Dear ~DEBBI, [Consult Letter:] : I had the pleasure of evaluating your patient, [unfilled]. [Consult Closing:] : Thank you very much for allowing me to participate in the care of this patient.  If you have any questions, please do not hesitate to contact me. [Sincerely,] : Sincerely, [FreeTextEntry2] : Donnell Larson MD\par  [FreeTextEntry3] : Romulo Brennan MD FCCP\par

## 2021-10-29 NOTE — PROCEDURE
[FreeTextEntry1] : 06/23/2021\par Pulmonary function testing\par These data demonstrate a very mild obstructive ventilatory deficit. Normal Lung Volumes. There is a moderate diffusion impairment. Corrects to mild with lung volume correction \par \par EXAM: XR CHEST PORTABLE URGENT 1V\par \par \par PROCEDURE DATE: Apr 4 2021\par \par \par \par INTERPRETATION: CLINICAL INFORMATION: Chest pain. Covid positive.\par \par EXAM: Frontal view of chest.\par \par COMPARISON: None.\par \par FINDINGS:\par \par No pleural effusion or pneumothorax.\par Normal heart size.\par Left lower peripheral lung hazy opacity.\par No acute osseous abnormality.\par \par IMPRESSION:\par Left lower peripheral lung hazy opacity consistent with COVID.\par \par \par \par FLORENCIO PIERCE MD; Resident Radiology\par This document has been electronically signed.\par NENO JULIO MD; Attending Interventional Radiologist\par This document has been electronically signed. Apr 4 2021 9:50AM

## 2021-10-29 NOTE — DISCUSSION/SUMMARY
[FreeTextEntry1] : Mild chronic obstructive pulmonary disease.\par Status post Covid pneumonia.  Clinical and radiographic resolution.\par Elevated D-dimer in hospital.  On Xarelto.\par

## 2021-10-29 NOTE — ASSESSMENT
[FreeTextEntry1] : Screening CT in few months.  Would wait for complete resolution of parenchymal abnormalities related to Covid.\par Complete 30 days of Xarelto.\par Repeat D-dimer.\par Continue present bronchodilator therapy\par

## 2022-02-21 NOTE — MONOCLONAL ANTIBODY INFUSION - ASSESSMENT AND PLAN
Well , 15-17 Years Old  Well-child exams are recommended visits with a health care provider to track your growth and development at certain ages. This sheet tells you what to expect during this visit.  Recommended immunizations  · Tetanus and diphtheria toxoids and acellular pertussis (Tdap) vaccine.  ? Adolescents aged 11-18 years who are not fully immunized with diphtheria and tetanus toxoids and acellular pertussis (DTaP) or have not received a dose of Tdap should:  § Receive a dose of Tdap vaccine. It does not matter how long ago the last dose of tetanus and diphtheria toxoid-containing vaccine was given.  § Receive a tetanus diphtheria (Td) vaccine once every 10 years after receiving the Tdap dose.  ? Pregnant adolescents should be given 1 dose of the Tdap vaccine during each pregnancy, between weeks 27 and 36 of pregnancy.  · You may get doses of the following vaccines if needed to catch up on missed doses:  ? Hepatitis B vaccine. Children or teenagers aged 11-15 years may receive a 2-dose series. The second dose in a 2-dose series should be given 4 months after the first dose.  ? Inactivated poliovirus vaccine.  ? Measles, mumps, and rubella (MMR) vaccine.  ? Varicella vaccine.  ? Human papillomavirus (HPV) vaccine.  · You may get doses of the following vaccines if you have certain high-risk conditions:  ? Pneumococcal conjugate (PCV13) vaccine.  ? Pneumococcal polysaccharide (PPSV23) vaccine.  · Influenza vaccine (flu shot). A yearly (annual) flu shot is recommended.  · Hepatitis A vaccine. A teenager who did not receive the vaccine before 2 years of age should be given the vaccine only if he or she is at risk for infection or if hepatitis A protection is desired.  · Meningococcal conjugate vaccine. A booster should be given at 16 years of age.  ? Doses should be given, if needed, to catch up on missed doses. Adolescents aged 11-18 years who have certain high-risk conditions should receive 2  doses. Those doses should be given at least 8 weeks apart.  ? Teens and young adults 16-23 years old may also be vaccinated with a serogroup B meningococcal vaccine.  Testing  Your health care provider may talk with you privately, without parents present, for at least part of the well-child exam. This may help you to become more open about sexual behavior, substance use, risky behaviors, and depression. If any of these areas raises a concern, you may have more testing to make a diagnosis. Talk with your health care provider about the need for certain screenings.  Vision  · Have your vision checked every 2 years, as long as you do not have symptoms of vision problems. Finding and treating eye problems early is important.  · If an eye problem is found, you may need to have an eye exam every year (instead of every 2 years). You may also need to visit an eye specialist.  Hepatitis B  · If you are at high risk for hepatitis B, you should be screened for this virus. You may be at high risk if:  ? You were born in a country where hepatitis B occurs often, especially if you did not receive the hepatitis B vaccine. Talk with your health care provider about which countries are considered high-risk.  ? One or both of your parents was born in a high-risk country and you have not received the hepatitis B vaccine.  ? You have HIV or AIDS (acquired immunodeficiency syndrome).  ? You use needles to inject street drugs.  ? You live with or have sex with someone who has hepatitis B.  ? You are male and you have sex with other males (MSM).  ? You receive hemodialysis treatment.  ? You take certain medicines for conditions like cancer, organ transplantation, or autoimmune conditions.  If you are sexually active:  · You may be screened for certain STDs (sexually transmitted diseases), such as:  ? Chlamydia.  ? Gonorrhea (females only).  ? Syphilis.  · If you are a female, you may also be screened for pregnancy.  If you are  62 y/o female w/ hx of _____ who came in for monoclonal antibody infusion for being tested positive for COVID 19 on _________. The pt states she has been experiencing __________. She denies any CP, SOB, chills, numbness/tingling in b/l limbs, loss of sensation or motor function, n/v/d.    Pt was referred by  __________  PLAN:  - infusion procedure explained to patient   -Consent for monoclonal antibody infusion obtained   - Risk & benifits discussed/all questions answered  -infuse  Casirivimab 1,200mg and Imdevimab 1,200mg  IV over one hour   -observe patient for one hour post infusion      I have reviewed the Casirivimab and Imdevimab Emergency Use Authorization (EUA) and I have provided the patient or patient's caregiver with the following information:      1. FDA has authorized emergency use Casirivimab and Imdevimab, which is not an FDA-approved biological product.  2. The patient or patient's caregiver has the option to accept or refuse administration of Casirivimab and Imdevimab   3. The significant known and potential risks and benefits of Casirivimab and Imdevimab and the extent to which such risks and benefits are unknown.  4. Information on available alternative treatments and risks and benefits of those alternatives.    The patient's COVID monoclonal antibody infusion went well without any complications. The patient tolerated the infusion without any reactions. Her vitals were stable throughout the infusion. The pt denies any CP, fevers, chills, SOB, numbness/tingling in b/l limbs, loss of sensation or motor function, n/v/d while receiving the transfusion. She denies any symptoms an hour after post infusion. Her/His vitals were taken post transfusion and were stable. The patient was discharged at _____ on 04/03/21. Pt is medically cleared to be discharged home. Discharge instructions were provided to the patient with a fact sheet included. Patient was instructed to self-isolate and use infection control measures (e.g wear mask, isolate, social distance, avoid sharing personal items, clean and disinfect "high touch" surfaces, and frequent handwashing according to the CDC guidelines. The patient was informed on what symptoms to be aware of for the next couple of days, and if there are any issues to call the 24/7 clinical call center. Patient was instructed to follow up with her PCP as needed.     female:  · Your health care provider may ask:  ? Whether you have begun menstruating.  ? The start date of your last menstrual cycle.  ? The typical length of your menstrual cycle.  · Depending on your risk factors, you may be screened for cancer of the lower part of your uterus (cervix).  ? In most cases, you should have your first Pap test when you turn 21 years old. A Pap test, sometimes called a pap smear, is a screening test that is used to check for signs of cancer of the vagina, cervix, and uterus.  ? If you have medical problems that raise your chance of getting cervical cancer, your health care provider may recommend cervical cancer screening before age 21.  Other tests    · You will be screened for:  ? Vision and hearing problems.  ? Alcohol and drug use.  ? High blood pressure.  ? Scoliosis.  ? HIV.  · You should have your blood pressure checked at least once a year.  · Depending on your risk factors, your health care provider may also screen for:  ? Low red blood cell count (anemia).  ? Lead poisoning.  ? Tuberculosis (TB).  ? Depression.  ? High blood sugar (glucose).  · Your health care provider will measure your BMI (body mass index) every year to screen for obesity. BMI is an estimate of body fat and is calculated from your height and weight.    General instructions  Talking with your parents    · Allow your parents to be actively involved in your life. You may start to depend more on your peers for information and support, but your parents can still help you make safe and healthy decisions.  · Talk with your parents about:  ? Body image. Discuss any concerns you have about your weight, your eating habits, or eating disorders.  ? Bullying. If you are being bullied or you feel unsafe, tell your parents or another trusted adult.  ? Handling conflict without physical violence.  ? Dating and sexuality. You should never put yourself in or stay in a situation that makes you feel uncomfortable. If you do  not want to engage in sexual activity, tell your partner no.  ? Your social life and how things are going at school. It is easier for your parents to keep you safe if they know your friends and your friends' parents.  · Follow any rules about curfew and chores in your household.  · If you feel mccloud, depressed, anxious, or if you have problems paying attention, talk with your parents, your health care provider, or another trusted adult. Teenagers are at risk for developing depression or anxiety.    Oral health    · Brush your teeth twice a day and floss daily.  · Get a dental exam twice a year.    Skin care  · If you have acne that causes concern, contact your health care provider.  Sleep  · Get 8.5-9.5 hours of sleep each night. It is common for teenagers to stay up late and have trouble getting up in the morning. Lack of sleep can cause many problems, including difficulty concentrating in class or staying alert while driving.  · To make sure you get enough sleep:  ? Avoid screen time right before bedtime, including watching TV.  ? Practice relaxing nighttime habits, such as reading before bedtime.  ? Avoid caffeine before bedtime.  ? Avoid exercising during the 3 hours before bedtime. However, exercising earlier in the evening can help you sleep better.  What's next?  Visit a pediatrician yearly.  Summary  · Your health care provider may talk with you privately, without parents present, for at least part of the well-child exam.  · To make sure you get enough sleep, avoid screen time and caffeine before bedtime, and exercise more than 3 hours before you go to bed.  · If you have acne that causes concern, contact your health care provider.  · Allow your parents to be actively involved in your life. You may start to depend more on your peers for information and support, but your parents can still help you make safe and healthy decisions.  This information is not intended to replace advice given to you by your health  60 y/o female w/ hx of _____ who came in for monoclonal antibody infusion for being tested positive for COVID 19 on _________. The pt states she has been experiencing __________. She denies any CP, SOB, chills, numbness/tingling in b/l limbs, loss of sensation or motor function, n/v/d.    Pt was referred by  __________  PLAN:  - infusion procedure explained to patient   -Consent for monoclonal antibody infusion obtained   - Risk & benifits discussed/all questions answered  -infuse  Casirivimab 1,200mg and Imdevimab 1,200mg  IV over one hour   -observe patient for one hour post infusion      I have reviewed the Casirivimab and Imdevimab Emergency Use Authorization (EUA) and I have provided the patient or patient's caregiver with the following information:      1. FDA has authorized emergency use Casirivimab and Imdevimab, which is not an FDA-approved biological product.  2. The patient or patient's caregiver has the option to accept or refuse administration of Casirivimab and Imdevimab   3. The significant known and potential risks and benefits of Casirivimab and Imdevimab and the extent to which such risks and benefits are unknown.  4. Information on available alternative treatments and risks and benefits of those alternatives.    Patient arrived to the [facility] infusion tent short of breath with an O2 sat in the mid-high 80's (86-89), after resting the O2 sat remained 91-94%. The infusion was started. Patient has a history of COPD and did not take her medication this morning because she was feeling weak.     The patient's COVID monoclonal antibody infusion went well without any complications. The patient tolerated the infusion without any reactions. Her vitals were stable throughout the infusion. The pt denies any CP, fevers, chills, SOB, numbness/tingling in b/l limbs, loss of sensation or motor function, n/v/d while receiving the transfusion. She denies any symptoms an hour after post infusion. Her/His vitals were taken post transfusion and were stable. The patient was discharged at _____ on 04/03/21. Pt is medically cleared to be discharged home. Discharge instructions were provided to the patient with a fact sheet included. Patient was instructed to self-isolate and use infection control measures (e.g wear mask, isolate, social distance, avoid sharing personal items, clean and disinfect "high touch" surfaces, and frequent handwashing according to the CDC guidelines. The patient was informed on what symptoms to be aware of for the next couple of days, and if there are any issues to call the 24/7 clinical call center. Patient was instructed to follow up with her PCP as needed.     care provider. Make sure you discuss any questions you have with your health care provider.  Document Revised: 04/07/2020 Document Reviewed: 07/27/2018  Elsevier Patient Education © 2021 [employer]     60 y/o female w/ hx of COPD on Advair who came in for monoclonal antibody infusion for being tested positive for COVID 19 on 3/25/21 after exposure from sister. The pt states she has been experiencing cough, nausea, malaise, headache, CASTRO today, diarrhea and loss of smell. She denies any CP, SOB, chills, numbness/tingling in b/l limbs, loss of sensation or motor function, vomiting.     Pt was referred by Dr. Donnell Larson, cardiologist.  PLAN:  - infusion procedure explained to patient   -Consent for monoclonal antibody infusion obtained   - Risk & benifits discussed/all questions answered  -infuse  Casirivimab 1,200mg and Imdevimab 1,200mg  IV over one hour   -observe patient for one hour post infusion      I have reviewed the Casirivimab and Imdevimab Emergency Use Authorization (EUA) and I have provided the patient or patient's caregiver with the following information:      1. FDA has authorized emergency use Casirivimab and Imdevimab, which is not an FDA-approved biological product.  2. The patient or patient's caregiver has the option to accept or refuse administration of Casirivimab and Imdevimab   3. The significant known and potential risks and benefits of Casirivimab and Imdevimab and the extent to which such risks and benefits are unknown.  4. Information on available alternative treatments and risks and benefits of those alternatives.    Patient arrived to the [facility] infusion tent short of breath with an O2 sat in the mid-high 80's (86-89), after resting for 3-5 minutes the O2 sat came up and remained 90-94%. The infusion was started. Patient has a history of COPD and did not take her medication (Advair) this morning because she was feeling weak.     The patient's COVID monoclonal antibody infusion went well without any complications. The patient tolerated the infusion without any reactions. Her vitals were stable throughout the infusion. The pt denies any CP, fevers, chills, SOB, numbness/tingling in b/l limbs, loss of sensation or motor function, n/v/d while receiving the transfusion. She denies any symptoms an hour after post infusion. Her/His vitals were taken post transfusion and were stable. The patient was discharged at _____ on 04/03/21. Pt is medically cleared to be discharged home. Discharge instructions were provided to the patient with a fact sheet included. Patient was instructed to self-isolate and use infection control measures (e.g wear mask, isolate, social distance, avoid sharing personal items, clean and disinfect "high touch" surfaces, and frequent handwashing according to the CDC guidelines. The patient was informed on what symptoms to be aware of for the next couple of days, and if there are any issues to call the 24/7 clinical call center. Patient was instructed to follow up with her PCP as needed.     60 y/o female w/ hx of COPD on Advair who came in for monoclonal antibody infusion for being tested positive for COVID 19 on 3/25/21 after exposure from sister. The pt states she has been experiencing cough, nausea, malaise, headache, CASTRO today, diarrhea and loss of smell. She denies any CP, SOB, chills, numbness/tingling in b/l limbs, loss of sensation or motor function, vomiting.     Pt was referred by Dr. Donnell Larson, cardiologist.  PLAN:  - infusion procedure explained to patient   -Consent for monoclonal antibody infusion obtained   - Risk & benifits discussed/all questions answered  -infuse  Casirivimab 1,200mg and Imdevimab 1,200mg  IV over one hour   -observe patient for one hour post infusion      I have reviewed the Casirivimab and Imdevimab Emergency Use Authorization (EUA) and I have provided the patient or patient's caregiver with the following information:      1. FDA has authorized emergency use Casirivimab and Imdevimab, which is not an FDA-approved biological product.  2. The patient or patient's caregiver has the option to accept or refuse administration of Casirivimab and Imdevimab   3. The significant known and potential risks and benefits of Casirivimab and Imdevimab and the extent to which such risks and benefits are unknown.  4. Information on available alternative treatments and risks and benefits of those alternatives.    Patient arrived to the [facility] infusion tent short of breath with an O2 sat in the mid-high 80's (86-89), after resting for 3-5 minutes the O2 sat came up and remained 90-94%. The infusion was started. Patient has a history of COPD and did not take her medication (Advair) this morning because she was feeling weak.     The patient's COVID monoclonal antibody infusion went well without any complications. The patient tolerated the infusion without any reactions. Her vitals were stable throughout the infusion. The pt denies any CP, fevers, chills, SOB, numbness/tingling in b/l limbs, loss of sensation or motor function, n/v/d while receiving the transfusion. She denies any symptoms an hour after post infusion. Her/His vitals were taken post transfusion and were stable. The patient was discharged at 11:30 on 04/03/21. Pt is medically cleared to be discharged home. Discharge instructions were provided to the patient with a fact sheet included. Patient was instructed to self-isolate and use infection control measures (e.g wear mask, isolate, social distance, avoid sharing personal items, clean and disinfect "high touch" surfaces, and frequent handwashing according to the CDC guidelines. The patient was informed on what symptoms to be aware of for the next couple of days, and if there are any issues to call the 24/7 clinical call center. Patient was instructed to follow up with her PCP as needed.

## 2022-10-24 NOTE — H&P ADULT - NSHPOUTPATIENTPROVIDERS_GEN_ALL_CORE
Donnell Larson-Pulmonologist Isotretinoin Pregnancy And Lactation Text: This medication is Pregnancy Category X and is considered extremely dangerous during pregnancy. It is unknown if it is excreted in breast milk.

## 2024-03-08 NOTE — H&P ADULT - ASSESSMENT
negative
  60 Y/O F w/ PMH of COPD on Advair presents to ER p/w dyspnea x 7 days, worse last night, admitted to medicine for COVID-19 infection.

## 2025-03-01 NOTE — H&P ADULT - NEGATIVE CARDIOVASCULAR SYMPTOMS
no chest pain/no palpitations/no orthopnea/no paroxysmal nocturnal dyspnea/no peripheral edema/no claudication
non-cuffed